# Patient Record
Sex: FEMALE | Race: WHITE | NOT HISPANIC OR LATINO | Employment: FULL TIME | URBAN - METROPOLITAN AREA
[De-identification: names, ages, dates, MRNs, and addresses within clinical notes are randomized per-mention and may not be internally consistent; named-entity substitution may affect disease eponyms.]

---

## 2019-09-20 ENCOUNTER — OFFICE VISIT (OUTPATIENT)
Dept: OBGYN CLINIC | Facility: HOSPITAL | Age: 69
End: 2019-09-20
Payer: COMMERCIAL

## 2019-09-20 ENCOUNTER — HOSPITAL ENCOUNTER (OUTPATIENT)
Dept: RADIOLOGY | Facility: HOSPITAL | Age: 69
Discharge: HOME | End: 2019-09-20
Attending: ORTHOPAEDIC SURGERY

## 2019-09-20 ENCOUNTER — HOSPITAL ENCOUNTER (EMERGENCY)
Facility: HOSPITAL | Age: 69
Discharge: HOME | End: 2019-09-20
Attending: EMERGENCY MEDICINE | Admitting: EMERGENCY MEDICINE
Payer: COMMERCIAL

## 2019-09-20 VITALS
DIASTOLIC BLOOD PRESSURE: 97 MMHG | SYSTOLIC BLOOD PRESSURE: 143 MMHG | RESPIRATION RATE: 18 BRPM | HEART RATE: 70 BPM | OXYGEN SATURATION: 97 % | TEMPERATURE: 97.4 F

## 2019-09-20 VITALS
WEIGHT: 225 LBS | HEIGHT: 63 IN | SYSTOLIC BLOOD PRESSURE: 127 MMHG | DIASTOLIC BLOOD PRESSURE: 79 MMHG | BODY MASS INDEX: 39.87 KG/M2 | HEART RATE: 76 BPM

## 2019-09-20 DIAGNOSIS — R20.0 NUMBNESS OF LEFT HAND: Primary | ICD-10-CM

## 2019-09-20 DIAGNOSIS — M25.522 PAIN IN LEFT ELBOW: ICD-10-CM

## 2019-09-20 DIAGNOSIS — G56.22 ULNAR NEURITIS, LEFT: Primary | ICD-10-CM

## 2019-09-20 PROCEDURE — 99283 EMERGENCY DEPT VISIT LOW MDM: CPT

## 2019-09-20 PROCEDURE — 99203 OFFICE O/P NEW LOW 30 MIN: CPT | Performed by: ORTHOPAEDIC SURGERY

## 2019-09-20 PROCEDURE — 99284 EMERGENCY DEPT VISIT MOD MDM: CPT | Performed by: EMERGENCY MEDICINE

## 2019-09-20 RX ORDER — PREDNISONE 20 MG/1
40 TABLET ORAL ONCE
Status: COMPLETED | OUTPATIENT
Start: 2019-09-20 | End: 2019-09-20

## 2019-09-20 RX ORDER — GABAPENTIN 100 MG/1
100 CAPSULE ORAL ONCE
Status: COMPLETED | OUTPATIENT
Start: 2019-09-20 | End: 2019-09-20

## 2019-09-20 RX ORDER — PREDNISONE 20 MG/1
40 TABLET ORAL DAILY
Qty: 4 TABLET | Refills: 0 | Status: SHIPPED | OUTPATIENT
Start: 2019-09-20 | End: 2019-09-22

## 2019-09-20 RX ORDER — OXYCODONE HYDROCHLORIDE 5 MG/1
5 TABLET ORAL EVERY 4 HOURS PRN
Qty: 2 TABLET | Refills: 0 | Status: SHIPPED | OUTPATIENT
Start: 2019-09-20 | End: 2019-10-04

## 2019-09-20 RX ORDER — OXYCODONE HYDROCHLORIDE AND ACETAMINOPHEN 5; 325 MG/1; MG/1
1 TABLET ORAL ONCE
Status: COMPLETED | OUTPATIENT
Start: 2019-09-20 | End: 2019-09-20

## 2019-09-20 RX ORDER — CELECOXIB 200 MG/1
200 CAPSULE ORAL DAILY
Qty: 30 CAPSULE | Refills: 1 | Status: SHIPPED | OUTPATIENT
Start: 2019-09-20 | End: 2021-03-25

## 2019-09-20 RX ORDER — TRAMADOL HYDROCHLORIDE 50 MG/1
50 TABLET ORAL EVERY 6 HOURS PRN
COMMUNITY
End: 2019-10-04

## 2019-09-20 RX ORDER — GABAPENTIN 100 MG/1
100 CAPSULE ORAL DAILY
Qty: 14 CAPSULE | Refills: 1 | Status: SHIPPED | OUTPATIENT
Start: 2019-09-20 | End: 2019-10-04

## 2019-09-20 RX ORDER — OXYCODONE HYDROCHLORIDE AND ACETAMINOPHEN 5; 325 MG/1; MG/1
2 TABLET ORAL ONCE
Status: DISCONTINUED | OUTPATIENT
Start: 2019-09-20 | End: 2019-09-20

## 2019-09-20 RX ADMIN — GABAPENTIN 100 MG: 100 CAPSULE ORAL at 02:20

## 2019-09-20 RX ADMIN — PREDNISONE 40 MG: 20 TABLET ORAL at 01:38

## 2019-09-20 RX ADMIN — OXYCODONE HYDROCHLORIDE AND ACETAMINOPHEN 1 TABLET: 5; 325 TABLET ORAL at 02:01

## 2019-09-20 NOTE — ED ATTENDING ATTESTATION
9/20/2019  IRenuka MD, saw and evaluated the patient  I have discussed the patient with the resident/non-physician practitioner and agree with the resident's/non-physician practitioner's findings, Plan of Care, and MDM as documented in the resident's/non-physician practitioner's note, except where noted  All available labs and Radiology studies were reviewed  I was present for key portions of any procedure(s) performed by the resident/non-physician practitioner and I was immediately available to provide assistance  At this point I agree with the current assessment done in the Emergency Department  I have conducted an independent evaluation of this patient a history and physical is as follows:    ED Course         Critical Care Time  Procedures   82 yo female with hx of ulnar neuritis with parasthesias and pain in left hand with pain into arm  No weakness  Pt seen by pcp and put on steroids with some relief initially  Pt given ultram today with no relief  No fever  Vss, afebrile, lungs cta, rrr, abdomen soft nontender  Left hand with normal ulnar nerve function, no motor deficits, tenderness noted    Pain meds, hand referral

## 2019-09-20 NOTE — LETTER
September 20, 2019     Suresh [address]    Patient: Hilda Izaguirre   YOB: 1950   Date of Visit: 9/20/2019       Dear Dr Hasmukh Infante:    Thank you for referring Luna Grant to me for evaluation  Below are my notes for this consultation  If you have questions, please do not hesitate to call me  I look forward to following your patient along with you  Sincerely,        Arie Henderson MD        CC: No Recipients  Arie Henderson MD  9/20/2019  4:17 PM  Sign at close encounter  Chief Complaint   Patient presents with    Left Wrist - Pain           Assessment:  Left wrist pain- rule out neuritis    Plan : This patient certainly has symptoms that are consistent with nerve irritation, yet I cannot pin this down to either median or ulnar nerve because she has numbness in her 3rd 4th and 5th fingers  She is not tender at her elbow  Sensation was normal in all 5 fingers  Her lack of response to Medrol Dosepak is somewhat bothersome because this usually can quiet the symptoms acutely  I do not want her to take anymore steroids and I put her on Celebrex 200 mg 1 tablet once daily after breakfast   This nonsteroidal medicine decreases both inflammation and pain  She was given strict instructions to stop this medicine if she develops any heartburn, nausea, vomiting, or diarrhea  I sent her for EMGs and  nerve conduction studies to see if she truly has nerve impingement either at the elbow or wrist, although this is on the early side of finding positive studies after any type of injury  She should continue with the wrist splint on full time day and night and may take it off for bathing and then reapply the splint  I will see her back again in 10 days to review the EMGs and re-evaluate her progress at that time  also will try gabapentin 100 mg once a day to decrease the nerve irritation    I told her to stop this medicine if it makes her overly drowsy      HPI:   This is a 68-year-old white female presenting today for orthopedic evaluation regarding her left wrist and hand pain  She is self referred  She states that about 2 weeks ago she has been having increasing pain in her left wrist that radiates to her ring and small finger  This is associated numbness and tingling  She states that her hand suddenly became swollen and she struggled to get her rings off  She cannot recall any obvious injury or trauma but mentions she was recently gardening around the time of onset  She denies any known presence of insect bite or wounds  She does mention that she has had Lyme disease at least twice but this was treated  She was seen at a [facility] where she was diagnosed with ulnar neuritis and was recommended she had a wrist splint and she was placed on a Medrol Dosepak  She states that her pain was improved with the first 2 days of dosing but shortly returned  Her pain increased so much so that last night she reported to the ED complaining of 10/10 pain  She was given a dose of prednisone and gabapentin which considerably relieved her symptoms  PMHx:         Past Medical History:   Diagnosis Date    Ulnar neuritis, left        Past Surgical History:   Procedure Laterality Date    TONSILLECTOMY         No family history on file  Social History     Socioeconomic History    Marital status: /Civil Union     Spouse name: Not on file    Number of children: Not on file    Years of education: Not on file    Highest education level: Not on file   Occupational History    Not on file   Social Needs    Financial resource strain: Not on file    Food insecurity:     Worry: Not on file     Inability: Not on file    Transportation needs:     Medical: Not on file     Non-medical: Not on file   Tobacco Use    Smoking status: Never Smoker    Smokeless tobacco: Never Used   Substance and Sexual Activity    Alcohol use:  Yes  Drug use: Not Currently    Sexual activity: Not on file   Lifestyle    Physical activity:     Days per week: Not on file     Minutes per session: Not on file    Stress: Not on file   Relationships    Social connections:     Talks on phone: Not on file     Gets together: Not on file     Attends Sikh service: Not on file     Active member of club or organization: Not on file     Attends meetings of clubs or organizations: Not on file     Relationship status: Not on file    Intimate partner violence:     Fear of current or ex partner: Not on file     Emotionally abused: Not on file     Physically abused: Not on file     Forced sexual activity: Not on file   Other Topics Concern    Not on file   Social History Narrative    Not on file       Current Outpatient Medications   Medication Sig Dispense Refill    oxyCODONE (ROXICODONE) 5 mg immediate release tablet Take 1 tablet (5 mg total) by mouth every 4 (four) hours as needed for moderate pain for up to 2 dosesMax Daily Amount: 30 mg (Patient not taking: Reported on 9/20/2019) 2 tablet 0    predniSONE 20 mg tablet Take 2 tablets (40 mg total) by mouth daily for 2 days (Patient not taking: Reported on 9/20/2019) 4 tablet 0    traMADol (ULTRAM) 50 mg tablet Take 50 mg by mouth every 6 (six) hours as needed for moderate pain       No current facility-administered medications for this visit  Allergies: Latex    ROS:  Positive for orthopedic complaints noted above  The remaining 11/12 systems on the intake sheet that I reviewed were negative  PE:  /79   Pulse 76   Ht 5' 3" (1 6 m)   Wt 102 kg (225 lb)   BMI 39 86 kg/m²    Constitutional: The patient was  oriented to person, place, and time  Mildly heavy  In no acute distress  HEENT: Vision intact  Hearing normal  Swallowing normal   Head: Normocephalic  Cardiovascular: Intact distal pulses  Pulse regular  Pulmonary/Chest: Effort normal  No respiratory distress     Neurological: Alert and oriented to person, place, and time  Skin: Skin is warm  Psychiatric: Normal mood and affect  Ortho Exam:  On today's exam of the left wrist, she was wearing a wrist splint  There was warmth and swelling of the left hand compared to the opposite right side  She was nontender to palpation  She is able make a full composite fist   Muro pinch  is 5/5  She had bilaterally equal wrist range of motion  She can fully extend the elbow and flex to 130 degrees  The elbow is stable to varus and valgus stress  Today's exam showed a negative Phalen's, negative Tinel's at both the wrist and elbow  There is no ulnar nerve subluxation with flexion and extension  Sensation was intact to pinprick in all 5 fingers with increased sensation in the ring and little fingers of that left hand  She showed good capillary refill of all fingers  There is palpable distal radial pulse  There is no antecubital adenopathy or cellulitis noted      Studies reviewed:  None today    Scribe Attestation    I,:   Geraldine Joyner MA am acting as a scribe while in the presence of the attending physician :        I,:   Lionel Iniguez MD personally performed the services described in this documentation    as scribed in my presence :

## 2019-09-20 NOTE — PROGRESS NOTES
Chief Complaint   Patient presents with    Left Wrist - Pain           Assessment:  Left wrist pain- rule out neuritis    Plan : This patient certainly has symptoms that are consistent with nerve irritation, yet I cannot pin this down to either median or ulnar nerve because she has numbness in her 3rd 4th and 5th fingers  She is not tender at her elbow  Sensation was normal in all 5 fingers  Her lack of response to Medrol Dosepak is somewhat bothersome because this usually can quiet the symptoms acutely  I do not want her to take anymore steroids and I put her on Celebrex 200 mg 1 tablet once daily after breakfast   This nonsteroidal medicine decreases both inflammation and pain  She was given strict instructions to stop this medicine if she develops any heartburn, nausea, vomiting, or diarrhea  I sent her for EMGs and  nerve conduction studies to see if she truly has nerve impingement either at the elbow or wrist, although this is on the early side of finding positive studies after any type of injury  She should continue with the wrist splint on full time day and night and may take it off for bathing and then reapply the splint  I will see her back again in 10 days to review the EMGs and re-evaluate her progress at that time  also will try gabapentin 100 mg once a day to decrease the nerve irritation  I told her to stop this medicine if it makes her overly drowsy      HPI:   This is a 29-year-old white female presenting today for orthopedic evaluation regarding her left wrist and hand pain  She is self referred  She states that about 2 weeks ago she has been having increasing pain in her left wrist that radiates to her ring and small finger  This is associated numbness and tingling  She states that her hand suddenly became swollen and she struggled to get her rings off  She cannot recall any obvious injury or trauma but mentions she was recently gardening around the time of onset    She denies any known presence of insect bite or wounds  She does mention that she has had Lyme disease at least twice but this was treated  She was seen at a [facility] where she was diagnosed with ulnar neuritis and was recommended she had a wrist splint and she was placed on a Medrol Dosepak  She states that her pain was improved with the first 2 days of dosing but shortly returned  Her pain increased so much so that last night she reported to the ED complaining of 10/10 pain  She was given a dose of prednisone and gabapentin which considerably relieved her symptoms  PMHx:         Past Medical History:   Diagnosis Date    Ulnar neuritis, left        Past Surgical History:   Procedure Laterality Date    TONSILLECTOMY         No family history on file  Social History     Socioeconomic History    Marital status: /Civil Union     Spouse name: Not on file    Number of children: Not on file    Years of education: Not on file    Highest education level: Not on file   Occupational History    Not on file   Social Needs    Financial resource strain: Not on file    Food insecurity:     Worry: Not on file     Inability: Not on file    Transportation needs:     Medical: Not on file     Non-medical: Not on file   Tobacco Use    Smoking status: Never Smoker    Smokeless tobacco: Never Used   Substance and Sexual Activity    Alcohol use:  Yes    Drug use: Not Currently    Sexual activity: Not on file   Lifestyle    Physical activity:     Days per week: Not on file     Minutes per session: Not on file    Stress: Not on file   Relationships    Social connections:     Talks on phone: Not on file     Gets together: Not on file     Attends Quaker service: Not on file     Active member of club or organization: Not on file     Attends meetings of clubs or organizations: Not on file     Relationship status: Not on file    Intimate partner violence:     Fear of current or ex partner: Not on file     Emotionally abused: Not on file     Physically abused: Not on file     Forced sexual activity: Not on file   Other Topics Concern    Not on file   Social History Narrative    Not on file       Current Outpatient Medications   Medication Sig Dispense Refill    oxyCODONE (ROXICODONE) 5 mg immediate release tablet Take 1 tablet (5 mg total) by mouth every 4 (four) hours as needed for moderate pain for up to 2 dosesMax Daily Amount: 30 mg (Patient not taking: Reported on 9/20/2019) 2 tablet 0    predniSONE 20 mg tablet Take 2 tablets (40 mg total) by mouth daily for 2 days (Patient not taking: Reported on 9/20/2019) 4 tablet 0    traMADol (ULTRAM) 50 mg tablet Take 50 mg by mouth every 6 (six) hours as needed for moderate pain       No current facility-administered medications for this visit  Allergies: Latex    ROS:  Positive for orthopedic complaints noted above  The remaining 11/12 systems on the intake sheet that I reviewed were negative  PE:  /79   Pulse 76   Ht 5' 3" (1 6 m)   Wt 102 kg (225 lb)   BMI 39 86 kg/m²   Constitutional: The patient was  oriented to person, place, and time  Mildly heavy  In no acute distress  HEENT: Vision intact  Hearing normal  Swallowing normal   Head: Normocephalic  Cardiovascular: Intact distal pulses  Pulse regular  Pulmonary/Chest: Effort normal  No respiratory distress  Neurological: Alert and oriented to person, place, and time  Skin: Skin is warm  Psychiatric: Normal mood and affect  Ortho Exam:  On today's exam of the left wrist, she was wearing a wrist splint  There was warmth and swelling of the left hand compared to the opposite right side  She was nontender to palpation  She is able make a full composite fist   Muro pinch  is 5/5  She had bilaterally equal wrist range of motion  She can fully extend the elbow and flex to 130 degrees  The elbow is stable to varus and valgus stress    Today's exam showed a negative Phalen's, negative Tinel's at both the wrist and elbow  There is no ulnar nerve subluxation with flexion and extension  Sensation was intact to pinprick in all 5 fingers with increased sensation in the ring and little fingers of that left hand  She showed good capillary refill of all fingers  There is palpable distal radial pulse  There is no antecubital adenopathy or cellulitis noted      Studies reviewed:  None today    Scribe Attestation    I,:   Rahat Melgar MA am acting as a scribe while in the presence of the attending physician :        I,:   Mariano Limon MD personally performed the services described in this documentation    as scribed in my presence :

## 2019-09-20 NOTE — PATIENT INSTRUCTIONS
Plan :  This patient certainly has symptoms that are consistent with nerve irritation, yet I cannot pin this down to either median or ulnar nerve because she has numbness in her 3rd 4th and 5th fingers  She is not tender at her elbow  Sensation was normal in all 5 fingers  Her lack of response to Medrol Dosepak is somewhat bothersome because this usually can quiet the symptoms acutely  I do not want her to take anymore steroids and I put her on Celebrex 200 mg 1 tablet once daily after breakfast   This nonsteroidal medicine decreases both inflammation and pain  She was given strict instructions to stop this medicine if she develops any heartburn, nausea, vomiting, or diarrhea  I sent her for EMGs and  nerve conduction studies to see if she truly has nerve impingement either at the elbow or wrist, although this is on the early side of finding positive studies after any type of injury  She should continue with the wrist splint on full time day and night and may take it off for bathing and then reapply the splint  I will see her back again in 10 days to review the EMGs and re-evaluate her progress at that time  I also will try gabapentin 100 mg once a day to decrease the nerve irritation    I told her to stop this medicine if it makes her overly drowsy

## 2019-09-20 NOTE — ED PROVIDER NOTES
History  Chief Complaint   Patient presents with    Wrist Pain     About a week ago pt was dianosed with L ulnarnoritis  Has been in unable to control pain at home with given medications here for pain management,  Reports a 10/10 pain in her L wrist as well as numbness and tingling     73-year-old female presents with 2 weeks of upper extremity pain and paresthesia  Says about a week ago she saw her PCP, was prescribed steroid with taper had some relief with the 1st couple of days but pain and tingling left upper extremity returned  Says that she mostly has tingling in her left 4th and 5th fingers and on the medial aspect of her left hand on the palmar side  Says the pain radiates up her left arm towards the shoulder  Was taking Motrin but discontinued as she was concerned about dark stool  Wonders if resting her left arm on the car door when she drives is contributory  Denies any weakness in the left upper extremity or hand  Has appointment with orthopedic hand surgeon tomorrow  Here for pain control, worried she will not be able to sleep tonight  Saw PCP again, was prescribed Ultram which had no affect with multiple doses today  Denies any trauma, any neck pain, any systemic symptoms  Denies chest pain, abdominal pain, back pain, shortness of breath, fever, chills, nausea, vomiting, diarrhea, dysuria, recent illness  Other PMH noncontributory  Prior to Admission Medications   Prescriptions Last Dose Informant Patient Reported? Taking?   traMADol (ULTRAM) 50 mg tablet   Yes Yes   Sig: Take 50 mg by mouth every 6 (six) hours as needed for moderate pain      Facility-Administered Medications: None       Past Medical History:   Diagnosis Date    Ulnar neuritis, left        Past Surgical History:   Procedure Laterality Date    TONSILLECTOMY         History reviewed  No pertinent family history  I have reviewed and agree with the history as documented      Social History     Tobacco Use    Smoking status: Never Smoker    Smokeless tobacco: Never Used   Substance Use Topics    Alcohol use: Yes    Drug use: Not Currently        Review of Systems   Constitutional: Negative for chills and fever  HENT: Negative for ear pain, sinus pain and sore throat  Eyes: Negative for pain  Respiratory: Negative for shortness of breath  Cardiovascular: Negative for chest pain  Gastrointestinal: Negative for abdominal pain, diarrhea, nausea and vomiting  Genitourinary: Negative for difficulty urinating and flank pain  Musculoskeletal: Negative for back pain and neck pain  Neurological: Positive for numbness  Negative for weakness and headaches  Tingling and pain   All other systems reviewed and are negative  Physical Exam  ED Triage Vitals [09/20/19 0040]   Temperature Pulse Respirations Blood Pressure SpO2   (!) 97 4 °F (36 3 °C) 70 18 143/97 97 %      Temp Source Heart Rate Source Patient Position - Orthostatic VS BP Location FiO2 (%)   Oral Monitor Sitting Right arm --      Pain Score       Worst Possible Pain             Orthostatic Vital Signs  Vitals:    09/20/19 0040   BP: 143/97   Pulse: 70   Patient Position - Orthostatic VS: Sitting       Physical Exam   Constitutional: She appears well-developed and well-nourished  No distress  HENT:   Head: Normocephalic  Nose: Nose normal    Mouth/Throat: Oropharynx is clear and moist    Eyes: Conjunctivae and EOM are normal  Right eye exhibits no discharge  Left eye exhibits no discharge  Neck: Normal range of motion  Neck supple  Cardiovascular: Normal rate and regular rhythm  Pulmonary/Chest: Effort normal  No stridor  No respiratory distress  Abdominal: She exhibits no distension  Musculoskeletal: Normal range of motion  She exhibits no edema, tenderness or deformity  Hyperesthesia left 4th and 5th fingers, medial and palmar side left hand  Normal strength, normal range of motion left hand and left upper extremity    Normal pulse, well perfused  Neurological: She is alert  No cranial nerve deficit  Skin: Skin is warm and dry  She is not diaphoretic  Psychiatric: She has a normal mood and affect  Her behavior is normal    Nursing note and vitals reviewed  ED Medications  Medications   predniSONE tablet 40 mg (40 mg Oral Given 9/20/19 0138)   oxyCODONE-acetaminophen (PERCOCET) 5-325 mg per tablet 1 tablet (1 tablet Oral Given 9/20/19 0201)   gabapentin (NEURONTIN) capsule 100 mg (100 mg Oral Given 9/20/19 0220)       Diagnostic Studies  Results Reviewed     None                 No orders to display         Procedures  Procedures        ED Course                               MDM  Number of Diagnoses or Management Options  Ulnar neuritis, left:   Diagnosis management comments: Prednisone, Percocet, gabapentin  X-rays likely to be low yield  Patient already has follow-up with Orthopedics tomorrow  Disposition  Final diagnoses:   Ulnar neuritis, left     Time reflects when diagnosis was documented in both MDM as applicable and the Disposition within this note     Time User Action Codes Description Comment    9/20/2019  1:57 AM Bean Mcnamara [G56 22] Ulnar neuritis, left       ED Disposition     ED Disposition Condition Date/Time Comment    Discharge Stable Fri Sep 20, 2019  1:56 AM Lilia Izaguirre discharge to home/self care  Follow-up Information     Follow up With Specialties Details Why Contact Info    Meghna Cabrera MD Family Medicine Schedule an appointment as soon as possible for a visit  For follow up regarding your symptoms [address] [facility]  [phone]      Hand surgeon  Go to  Please make your appointment tomorrow             Discharge Medication List as of 9/20/2019  2:38 AM      START taking these medications    Details   oxyCODONE (ROXICODONE) 5 mg immediate release tablet Take 1 tablet (5 mg total) by mouth every 4 (four) hours as needed for moderate pain for up to 2 dosesMax Daily Amount: 30 mg, Starting Fri 9/20/2019, Print      predniSONE 20 mg tablet Take 2 tablets (40 mg total) by mouth daily for 2 days, Starting Fri 9/20/2019, Until Sun 9/22/2019, Normal         CONTINUE these medications which have NOT CHANGED    Details   traMADol (ULTRAM) 50 mg tablet Take 50 mg by mouth every 6 (six) hours as needed for moderate pain, Historical Med           No discharge procedures on file  ED Provider  Attending physically available and evaluated Elena DOVER managed the patient along with the ED Attending      Electronically Signed by         Jose Britt MD  09/20/19 7328

## 2019-09-21 DIAGNOSIS — M79.2 NEURITIS OF UPPER EXTREMITY: Primary | ICD-10-CM

## 2019-09-21 RX ORDER — METHYLPREDNISOLONE 4 MG/1
TABLET ORAL
Qty: 21 TABLET | Refills: 0 | Status: SHIPPED | OUTPATIENT
Start: 2019-09-21 | End: 2019-10-04

## 2019-09-25 ENCOUNTER — PROCEDURE VISIT (OUTPATIENT)
Dept: NEUROLOGY | Facility: CLINIC | Age: 69
End: 2019-09-25
Payer: COMMERCIAL

## 2019-09-25 ENCOUNTER — TELEPHONE (OUTPATIENT)
Dept: OBGYN CLINIC | Facility: MEDICAL CENTER | Age: 69
End: 2019-09-25

## 2019-09-25 DIAGNOSIS — R20.0 NUMBNESS OF LEFT HAND: ICD-10-CM

## 2019-09-25 PROCEDURE — 95886 MUSC TEST DONE W/N TEST COMP: CPT | Performed by: PHYSICAL MEDICINE & REHABILITATION

## 2019-09-25 PROCEDURE — 95909 NRV CNDJ TST 5-6 STUDIES: CPT | Performed by: PHYSICAL MEDICINE & REHABILITATION

## 2019-09-25 NOTE — PROGRESS NOTES
EMG 2 Limb Upper Extremity     Date/Time 9/25/2019 3:20 PM     Performed by  Nasrin Johnson MD     Authorized by Katelyn Rivera MD      [place] Protocol Consent: Verbal consent obtained  Risks and benefits: risks, benefits and alternatives were discussed  Consent given by: patient  Patient understanding: patient states understanding of the procedure being performed  Patient consent: the patient's understanding of the procedure matches consent given  Patient identity confirmed: verbally with patient             EMG REPORT    59-year-old white female presents with left wrist and hand pain  She states that about 2 weeks ago she has been having increasing pain in the left wrist that radiates to the ring and small finger associated with tingling and numbness  Patient is being referred for evaluation of a focal neuropathy  The left and right median and right ulnar motor conduction velocities and compound muscle action potentials were normal with normal distal latencies across the wrists  The left ulnar motor terminal latency was normal with a low compound motor action potential amplitude of 4 3 mV and normal conduction velocity distally and across the elbow  The left and right median,radial and ulnar sensory conduction velocity and sensory action potentials were also normal with normal distal latencies across the wrists  The left and right median and ulnar F wave latencies were within normal limits  Concentric needle EMG of the upper extremities  was performed on various proximal and distal muscles of the bilateral upper extremities including deltoid, biceps, triceps, FCU,FDP4,5 apb, FDI and low cervical paraspinals  There was no evidence of active denervation any other muscles tested  Moderate decreased recruitment of giant motor units was noted in the FCU, and FDI  Mild decreased recruitment of giant motor units was noted in the FDP 4, 5   Early recruited motor units appear normal with recruitment patterns being full or full for effort in the remaining muscles tested  INTERPRETATION: There is electrophysiologic evidence of a:    1  Ulnar neuropathy at the elbow  on the left with  predominant axonal changes , affecting mainly the motor branch  2 Mild median nerve compression neuropathy at the wrist on the right with demyelinative changes, consistent with a diagnosis of carpal tunnel syndrome  3   There is no evidence of a cervical radiculopathy bilaterally  Clinical correlation is recommended       ADAL Lainez

## 2019-09-26 NOTE — TELEPHONE ENCOUNTER
I called the patient with the results of her EMGs  The report states that there is ulnar nerve entrapment, mainly motor, at the elbow and mild carpal tunnel syndrome  She had no tenderness over the elbow at the time of her exam and this further obfuscates the diagnosis  I will send her to see my partner Dr Loki Bowen, our chief of hand surgery, for 2nd opinion to see if he can decipher how to help this nice patient    Her pain which was terribly severe before has quieted somewhat with Motrin over the last day or so

## 2019-09-30 NOTE — TELEPHONE ENCOUNTER
Patient is calling back for an appointment with Dr Ofelia Paiz per Dr Vinay Cole   Email sent to practice admin

## 2019-10-04 ENCOUNTER — OFFICE VISIT (OUTPATIENT)
Dept: OBGYN CLINIC | Facility: HOSPITAL | Age: 69
End: 2019-10-04
Payer: COMMERCIAL

## 2019-10-04 VITALS
BODY MASS INDEX: 40.64 KG/M2 | WEIGHT: 229.4 LBS | SYSTOLIC BLOOD PRESSURE: 134 MMHG | HEART RATE: 80 BPM | DIASTOLIC BLOOD PRESSURE: 81 MMHG | HEIGHT: 63 IN

## 2019-10-04 DIAGNOSIS — M79.642 HAND PAIN, LEFT: ICD-10-CM

## 2019-10-04 DIAGNOSIS — M25.522 PAIN IN LEFT ELBOW: Primary | ICD-10-CM

## 2019-10-04 DIAGNOSIS — R20.0 NUMBNESS OF LEFT HAND: ICD-10-CM

## 2019-10-04 PROCEDURE — 99213 OFFICE O/P EST LOW 20 MIN: CPT | Performed by: ORTHOPAEDIC SURGERY

## 2019-10-04 RX ORDER — MELOXICAM 7.5 MG/1
7.5 TABLET ORAL DAILY
Qty: 30 TABLET | Refills: 1 | Status: SHIPPED | OUTPATIENT
Start: 2019-10-04 | End: 2021-03-25

## 2019-10-04 NOTE — PROGRESS NOTES
ASSESSMENT/PLAN:    Assessment:   Left hand numbness    Plan:   MRI left wrist eval for ganglion cyst, ulnar nerve entrapment at [facility], ulnar artery aneurism   Patient given a script for Mobic    Follow Up: After Testing    To Do Next Visit:   discuss MRI      _____________________________________________________  CHIEF COMPLAINT:  Chief Complaint   Patient presents with    Left Hand - Pain, Numbness         SUBJECTIVE:  Ld Norman is a 81y o  year old female who presents with Pain  Moderate  Intermittant  Dull, Electric and Aching and Numbness to the left long finger, ring finger and small finger  This started late August - early September 2019  She states it started with pain and swelling in the left ring finger  She states she had to force her rings off  Patient was initially seen at [place] [address]  were she was told she has ulnar neurits and given a wrist splint and medrol dose pack  She was later seen by Dr Criss Harrison on 9/20/19 who prescribe Celebrex and ordered an EMG and referred the patient here today for orthopedic consultation  Patient presents today to discuss the findings of the EMG along with treatment options  PAST MEDICAL HISTORY:  Past Medical History:   Diagnosis Date    Ulnar neuritis, left        PAST SURGICAL HISTORY:  Past Surgical History:   Procedure Laterality Date    TONSILLECTOMY         FAMILY HISTORY:  History reviewed  No pertinent family history  SOCIAL HISTORY:  Social History     Tobacco Use    Smoking status: Never Smoker    Smokeless tobacco: Never Used   Substance Use Topics    Alcohol use: Yes    Drug use: Not Currently       MEDICATIONS:    Current Outpatient Medications:     celecoxib (CeleBREX) 200 mg capsule, Take 1 capsule (200 mg total) by mouth daily, Disp: 30 capsule, Rfl: 1    ALLERGIES:  Allergies   Allergen Reactions    Latex        REVIEW OF SYSTEMS:  Pertinent items are noted in HPI    A comprehensive review of systems was negative  LABS:  HgA1c: No results found for: HGBA1C  BMP: No results found for: GLUCOSE, CALCIUM, NA, K, CO2, CL, BUN, CREATININE      _____________________________________________________  PHYSICAL EXAMINATION:  /81   Pulse 80   Ht 5' 3" (1 6 m)   Wt 104 kg (229 lb 6 4 oz)   BMI 40 64 kg/m²   General: well developed and well nourished, alert, oriented times 3 and appears comfortable  Psychiatric: Normal  HEENT: Trachea Midline, No torticollis  Cardiovascular: No discernable arrhythmia  Pulmonary: No wheezing or stridor  Skin: No masses, erythema, lacerations, fluctation, ulcerations  Neurovascular: Pulses Intact    MUSCULOSKELETAL EXAMINATION:    Left Carpal Tunnel Exam:  Negative thenar atrophy  Positive phalen's test  Positive carpal tunnel compression  Positive tinels carpal tunnel, Positive tinel's over guyon's cannel greater than carpal tunnel, positive compression at guyon's cannel,  Opposition strength 5/5  Abduction strength 5/5  FDP small 5/5, intrinsic 4/5, positive froment sign, no wartenberg, no clawing  Negative tinel's ulnar nerve at the elbow    left ring finger:  Positive palpable nodule over the A1 pulley  Positive tenderness to palpation over A1 pulley  Negative catching  Negative clicking  Mild crepitation      _____________________________________________________  STUDIES REVIEWED:  EMG: LUE shows ulnar entrapment at the left elbow and mild carpal tunnel syndrome        PROCEDURES PERFORMED:  Procedures  No Procedures performed today    Scribe Attestation    I,:   Nieves Esparza am acting as a scribe while in the presence of the attending physician :        I,:   Elizabeth Montano MD personally performed the services described in this documentation    as scribed in my presence :

## 2019-10-22 ENCOUNTER — HOSPITAL ENCOUNTER (OUTPATIENT)
Dept: MRI IMAGING | Facility: HOSPITAL | Age: 69
Discharge: HOME | End: 2019-10-22
Attending: ORTHOPAEDIC SURGERY
Payer: COMMERCIAL

## 2019-10-22 DIAGNOSIS — M79.642 HAND PAIN, LEFT: ICD-10-CM

## 2019-10-22 DIAGNOSIS — R20.0 NUMBNESS OF LEFT HAND: ICD-10-CM

## 2019-10-22 PROCEDURE — 73223 MRI JOINT UPR EXTR W/O&W/DYE: CPT

## 2019-10-22 PROCEDURE — A9585 GADOBUTROL INJECTION: HCPCS | Performed by: ORTHOPAEDIC SURGERY

## 2019-10-22 RX ADMIN — GADOBUTROL 10 ML: 604.72 INJECTION INTRAVENOUS at 15:48

## 2019-10-23 ENCOUNTER — TELEPHONE (OUTPATIENT)
Dept: OBGYN CLINIC | Facility: HOSPITAL | Age: 69
End: 2019-10-23

## 2019-10-23 NOTE — TELEPHONE ENCOUNTER
Please be advise I spoke to pt  Who stated the Tylenol and MOBIC is not helping  Pt  Is requesting a muscle relaxer   Please advise

## 2019-10-23 NOTE — TELEPHONE ENCOUNTER
Patient called in  # [phone]    Patient said that she had an MRI yesterday for her wrist and is now having pain in her back  Patient is requesting something for the pain  She said that she was evaluated for over 50 minutes and her hands was over her head and now she is having back pain  Please advise

## 2019-10-23 NOTE — TELEPHONE ENCOUNTER
The patient can take Tylenol 650mg 3 times per day with the mobic that was prescribed for her at her last visit  That is the only medication recommended at this time  Please make patient aware

## 2019-10-23 NOTE — TELEPHONE ENCOUNTER
I called patient back and left a detailed msg on her voicemail  I asked her to call our office back should there be any questions

## 2019-10-23 NOTE — TELEPHONE ENCOUNTER
D/W Dr Laney Nelson - we will not give a muscle relaxer for this and recommend taking the mobic and tylenol around the clock (not as needed) for 3-5 days  She can increase her Mobic 7 5mg to BID to take a total of 15mg a day temporarily as well, but should reduce this back to 7 5 once she is able  Also advise applying heat to the area and perform some gentle stretches  [facility] has a spine exercise program on their website if she needs help with these:   https://orthoinfo  aaos  org/globalassets/pdfs/2017-rehab_spine  pdf

## 2019-10-28 ENCOUNTER — OFFICE VISIT (OUTPATIENT)
Dept: OBGYN CLINIC | Facility: CLINIC | Age: 69
End: 2019-10-28
Payer: COMMERCIAL

## 2019-10-28 ENCOUNTER — PREP FOR PROCEDURE (OUTPATIENT)
Dept: OBGYN CLINIC | Facility: CLINIC | Age: 69
End: 2019-10-28

## 2019-10-28 VITALS
WEIGHT: 229 LBS | DIASTOLIC BLOOD PRESSURE: 89 MMHG | HEIGHT: 63 IN | HEART RATE: 84 BPM | BODY MASS INDEX: 40.57 KG/M2 | SYSTOLIC BLOOD PRESSURE: 142 MMHG

## 2019-10-28 DIAGNOSIS — G56.22 CUBITAL TUNNEL SYNDROME ON LEFT: Primary | ICD-10-CM

## 2019-10-28 DIAGNOSIS — Z01.812 PRE-OPERATIVE LABORATORY EXAMINATION: ICD-10-CM

## 2019-10-28 PROCEDURE — 99214 OFFICE O/P EST MOD 30 MIN: CPT | Performed by: ORTHOPAEDIC SURGERY

## 2019-10-28 RX ORDER — LIDOCAINE HYDROCHLORIDE AND EPINEPHRINE 10; 10 MG/ML; UG/ML
20 INJECTION, SOLUTION INFILTRATION; PERINEURAL ONCE
Status: CANCELLED | OUTPATIENT
Start: 2019-10-28 | End: 2019-10-28

## 2019-10-28 NOTE — PROGRESS NOTES
ASSESSMENT/PLAN:    Assessment:   Left elbow cubital tunnel syndrome    Plan:   Diagnostics reviewed and physical exam performed  Diagnosis, treatment options and associated risks were discussed with the patient including no treatment, nonsurgical treatment and potential for surgical intervention  The patient was given the opportunity to ask questions regarding each  Given her essentially normal wrist MRI her symptoms and exam findings are consistent with cubital tunnel syndrome at her left elbow  Risks and benefits of left cubital tunnel release were discussed  Consents obtained  Work noted provided  Follow Up: After Surgery    To Do Next Visit:    and Sutures out      Operative Discussions:     Cubital Tunnel Release: The anatomy and physiology of cubital tunnel syndrome were discussed with the patient today in the office  Typically, increased elbow flexion activities decrease blood flow within the intraneural spaces, resulting in a feeling of numbness, tingling, weakness, or clumsiness within the hand and fingers  Occasionally, anatomic structures such as medial elbow osteophytes, the medial head of the triceps, were subluxing ulnar nerve may result in increased pressure or aggravation at the cubital tunnel  Typical signs and symptoms usually include numbness and tingling within the ring and small finger, weakness with , and weakness with pinch  Conservative treatment and includes nocturnal bracing to keep the elbow in a semi-extended position, activity modification, therapy, and avoiding excessive elbow flexion activities  A majority of patients typically respond to conservative treatment over a period of approximately 3-6 months  EMG/NCV testing of the ulnar nerve at the elbow is not as reliable as carpal tunnel syndrome  Surgical intervention in the form of in situ release of the ulnar nerve at the elbow or ulnar nerve transposition may be required in up to 20% of patients   The patient has elected to undergo an cubital tunnel release  The possibility of converting to an open procedure, or a subcutaneous or submuscular ulnar nerve transposition depending on the nerve stability was discussed with the patient  Typically, in the postoperative period, light activities are allowed immediately, driving is allowed when narcotic medications have stopped, and the incision may get wet after 5 days  Heavy activities will be allowed after follow up appointment in 1-2 weeks  Anti-inflammatory medications should be held for approximately 5 days postoperative  While the pain within the ring and small finger of the hands generally improves rapidly, the numbness and tingling, as well as the strength, will slowly improve over a period of weeks to months  Total recovery can take up to 18 months from the time of surgery  Numbness and tingling near the incision, or near the medial aspect of the forearm was discussed with the patient  The patient has an understanding of the above mentioned discussion  The risks and benefits of the procedure were explained to the patient, which include, but are not limited to: Bleeding, infection, recurrence, pain, scar, damage to tendons, damage to nerves, and damage to blood vessels, failure to give desired results and complications related to anesthesia   These risks, along with alternative conservative treatment options, and postoperative protocols were voiced back and understood by the patient   All questions were answered to the patient's satisfaction   The patient agrees to comply with a standard postoperative protocol, and is willing to proceed   Education was provided via written and auditory forms   There were no barriers to learning   Written handouts regarding wound care, incision and scar care, and general preoperative information was provided to the patient   Prior to surgery, the patient may be requested to stop all anti-inflammatory medications   Prophylactic aspirin, Plavix, and Coumadin may be allowed to be continued   Medications including vitamin E , ginkgo, and fish oil are requested to be stopped approximately one week prior to surgery   Hypertensive medications and beta blockers, if taken, should be continued  Left elbow cubital tunnel release    _____________________________________________________  CHIEF COMPLAINT:  Chief Complaint   Patient presents with    Left Wrist - Follow-up         SUBJECTIVE:  Brenna Velez is a 81 y o  female who presents for discussion of her left wrist MRI  At her last visit her symptoms were suspicious for cubital tunnel and or carpal tunnel syndrome  She was sent for an MRI for further evaluation to make sure there is no compressive lesion at her wrist   She returns today with numbness and tingling small, ring, long finger  She has been finding relief with Tylenol and Aleve compared to previous Celebrex and Mobic  PAST MEDICAL HISTORY:  Past Medical History:   Diagnosis Date    Ulnar neuritis, left        PAST SURGICAL HISTORY:  Past Surgical History:   Procedure Laterality Date    TONSILLECTOMY         FAMILY HISTORY:  History reviewed  No pertinent family history  SOCIAL HISTORY:  Social History     Tobacco Use    Smoking status: Never Smoker    Smokeless tobacco: Never Used   Substance Use Topics    Alcohol use: Yes    Drug use: Not Currently       MEDICATIONS:    Current Outpatient Medications:     meloxicam (MOBIC) 7 5 mg tablet, Take 1 tablet (7 5 mg total) by mouth daily, Disp: 30 tablet, Rfl: 1    celecoxib (CeleBREX) 200 mg capsule, Take 1 capsule (200 mg total) by mouth daily (Patient not taking: Reported on 10/28/2019), Disp: 30 capsule, Rfl: 1    ALLERGIES:  Allergies   Allergen Reactions    Latex        REVIEW OF SYSTEMS:  Pertinent items are noted in HPI    A comprehensive review of systems was negative   + for joint pain, neck pain, muscle pain, back pain, waking up at night, cramping, numbness, tingling    LABS:  HgA1c: No results found for: HGBA1C  BMP: No results found for: GLUCOSE, CALCIUM, NA, K, CO2, CL, BUN, CREATININE        _____________________________________________________  PHYSICAL EXAMINATION:  Vital signs: /89   Pulse 84   Ht 5' 3" (1 6 m)   Wt 104 kg (229 lb)   BMI 40 57 kg/m²   General: well developed and well nourished, alert, oriented times 3 and appears comfortable  Psychiatric: Normal  HEENT: Trachea Midline, No torticollis  Cardiovascular: No discernable arrhythmia  Pulmonary: No wheezing or stridor  Skin: No masses, erythema, lacerations, fluctation, ulcerations  Neurovascular: Sensation Intact to the Median, Ulnar, Radial Nerve, Motor Intact to the Radial Nerve and Pulses Intact    MUSCULOSKELETAL EXAMINATION:  LEFT SIDE:  Elbow:  No bursitis and full ROM, good strength, + tinels cubital tunnel, slight clawing to the small finger and Wrist:  Full ROM, No Tenderness, No Instability and + tinels guyons cancal, FPL 5/5, intrinsics 4/5, APB 4+    _____________________________________________________  STUDIES REVIEWED:  Left wrist MRI was reviewed today and shows:  No compressive lesion or cyst at carpal tunnel or Guyon's canal      PROCEDURES PERFORMED:  Procedures  No Procedures performed today   Scribe Attestation    I,:   David Sims am acting as a scribe while in the presence of the attending physician :        I,:   Tavares Reagan MD personally performed the services described in this documentation    as scribed in my presence :            Scribe Attestation    I,:   David Sims am acting as a scribe while in the presence of the attending physician :        I,:   Tavares Reagan MD personally performed the services described in this documentation    as scribed in my presence :

## 2019-10-28 NOTE — H&P
ASSESSMENT/PLAN:    Assessment:   Left elbow cubital tunnel syndrome    Plan:   Diagnostics reviewed and physical exam performed  Diagnosis, treatment options and associated risks were discussed with the patient including no treatment, nonsurgical treatment and potential for surgical intervention  The patient was given the opportunity to ask questions regarding each  Given her essentially normal wrist MRI her symptoms and exam findings are consistent with cubital tunnel syndrome at her left elbow  Risks and benefits of left cubital tunnel release were discussed  Consents obtained  Work noted provided  Follow Up: After Surgery    To Do Next Visit:    and Sutures out      Operative Discussions:     Cubital Tunnel Release: The anatomy and physiology of cubital tunnel syndrome were discussed with the patient today in the office  Typically, increased elbow flexion activities decrease blood flow within the intraneural spaces, resulting in a feeling of numbness, tingling, weakness, or clumsiness within the hand and fingers  Occasionally, anatomic structures such as medial elbow osteophytes, the medial head of the triceps, were subluxing ulnar nerve may result in increased pressure or aggravation at the cubital tunnel  Typical signs and symptoms usually include numbness and tingling within the ring and small finger, weakness with , and weakness with pinch  Conservative treatment and includes nocturnal bracing to keep the elbow in a semi-extended position, activity modification, therapy, and avoiding excessive elbow flexion activities  A majority of patients typically respond to conservative treatment over a period of approximately 3-6 months  EMG/NCV testing of the ulnar nerve at the elbow is not as reliable as carpal tunnel syndrome  Surgical intervention in the form of in situ release of the ulnar nerve at the elbow or ulnar nerve transposition may be required in up to 20% of patients   The patient has elected to undergo an cubital tunnel release  The possibility of converting to an open procedure, or a subcutaneous or submuscular ulnar nerve transposition depending on the nerve stability was discussed with the patient  Typically, in the postoperative period, light activities are allowed immediately, driving is allowed when narcotic medications have stopped, and the incision may get wet after 5 days  Heavy activities will be allowed after follow up appointment in 1-2 weeks  Anti-inflammatory medications should be held for approximately 5 days postoperative  While the pain within the ring and small finger of the hands generally improves rapidly, the numbness and tingling, as well as the strength, will slowly improve over a period of weeks to months  Total recovery can take up to 18 months from the time of surgery  Numbness and tingling near the incision, or near the medial aspect of the forearm was discussed with the patient  The patient has an understanding of the above mentioned discussion  The risks and benefits of the procedure were explained to the patient, which include, but are not limited to: Bleeding, infection, recurrence, pain, scar, damage to tendons, damage to nerves, and damage to blood vessels, failure to give desired results and complications related to anesthesia   These risks, along with alternative conservative treatment options, and postoperative protocols were voiced back and understood by the patient   All questions were answered to the patient's satisfaction   The patient agrees to comply with a standard postoperative protocol, and is willing to proceed   Education was provided via written and auditory forms   There were no barriers to learning   Written handouts regarding wound care, incision and scar care, and general preoperative information was provided to the patient   Prior to surgery, the patient may be requested to stop all anti-inflammatory medications   Prophylactic aspirin, Plavix, and Coumadin may be allowed to be continued   Medications including vitamin E , ginkgo, and fish oil are requested to be stopped approximately one week prior to surgery   Hypertensive medications and beta blockers, if taken, should be continued  Left elbow cubital tunnel release    _____________________________________________________  CHIEF COMPLAINT:  Chief Complaint   Patient presents with    Left Wrist - Follow-up         SUBJECTIVE:  Jelly Toney is a 81 y o  female who presents for discussion of her left wrist MRI  At her last visit her symptoms were suspicious for cubital tunnel and or carpal tunnel syndrome  She was sent for an MRI for further evaluation to make sure there is no compressive lesion at her wrist   She returns today with numbness and tingling small, ring, long finger  She has been finding relief with Tylenol and Aleve compared to previous Celebrex and Mobic  PAST MEDICAL HISTORY:  Past Medical History:   Diagnosis Date    Ulnar neuritis, left        PAST SURGICAL HISTORY:  Past Surgical History:   Procedure Laterality Date    TONSILLECTOMY         FAMILY HISTORY:  History reviewed  No pertinent family history  SOCIAL HISTORY:  Social History     Tobacco Use    Smoking status: Never Smoker    Smokeless tobacco: Never Used   Substance Use Topics    Alcohol use: Yes    Drug use: Not Currently       MEDICATIONS:    Current Outpatient Medications:     meloxicam (MOBIC) 7 5 mg tablet, Take 1 tablet (7 5 mg total) by mouth daily, Disp: 30 tablet, Rfl: 1    celecoxib (CeleBREX) 200 mg capsule, Take 1 capsule (200 mg total) by mouth daily (Patient not taking: Reported on 10/28/2019), Disp: 30 capsule, Rfl: 1    ALLERGIES:  Allergies   Allergen Reactions    Latex        REVIEW OF SYSTEMS:  Pertinent items are noted in HPI    A comprehensive review of systems was negative   + for joint pain, neck pain, muscle pain, back pain, waking up at night, cramping, numbness, tingling    LABS:  HgA1c: No results found for: HGBA1C  BMP: No results found for: GLUCOSE, CALCIUM, NA, K, CO2, CL, BUN, CREATININE        _____________________________________________________  PHYSICAL EXAMINATION:  Vital signs: /89   Pulse 84   Ht 5' 3" (1 6 m)   Wt 104 kg (229 lb)   BMI 40 57 kg/m²    General: well developed and well nourished, alert, oriented times 3 and appears comfortable  Psychiatric: Normal  HEENT: Trachea Midline, No torticollis  Cardiovascular: No discernable arrhythmia  Pulmonary: No wheezing or stridor  Skin: No masses, erythema, lacerations, fluctation, ulcerations  Neurovascular: Sensation Intact to the Median, Ulnar, Radial Nerve, Motor Intact to the Radial Nerve and Pulses Intact    MUSCULOSKELETAL EXAMINATION:  LEFT SIDE:  Elbow:  No bursitis and full ROM, good strength, + tinels cubital tunnel, slight clawing to the small finger and Wrist:  Full ROM, No Tenderness, No Instability and + tinels guyons cancal, FPL 5/5, intrinsics 4/5, APB 4+    _____________________________________________________  STUDIES REVIEWED:  Left wrist MRI was reviewed today and shows:  No compressive lesion or cyst at carpal tunnel or Guyon's canal      PROCEDURES PERFORMED:  Procedures  No Procedures performed today

## 2019-10-28 NOTE — LETTER
October 28, 2019     Patient: Joana Salcido   YOB: 1950   Date of Visit: 10/28/2019       To Whom it May Concern:    Joana Salcido is under my professional care  She was seen in my office on 10/28/2019  She is to avoid repetitive pushing, pulling, or grasping, able to rest 5 mins of every one hour of work  If you have any questions or concerns, please don't hesitate to call           Sincerely,          Ki Marcelo MD        CC: No Recipients

## 2019-11-06 ENCOUNTER — OFFICE VISIT (OUTPATIENT)
Dept: OBGYN CLINIC | Facility: MEDICAL CENTER | Age: 69
End: 2019-11-06
Payer: COMMERCIAL

## 2019-11-06 VITALS
DIASTOLIC BLOOD PRESSURE: 76 MMHG | BODY MASS INDEX: 40.57 KG/M2 | HEART RATE: 108 BPM | SYSTOLIC BLOOD PRESSURE: 137 MMHG | WEIGHT: 229 LBS | HEIGHT: 63 IN

## 2019-11-06 DIAGNOSIS — R20.0 NUMBNESS OF LEFT HAND: Primary | ICD-10-CM

## 2019-11-06 PROCEDURE — 99214 OFFICE O/P EST MOD 30 MIN: CPT | Performed by: ORTHOPAEDIC SURGERY

## 2019-11-06 NOTE — PATIENT INSTRUCTIONS
Cubital Tunnel Syndrome    DESCRIPTION  Cubital Tunnel Syndrome is a condition that involves pressure or stretching of the ulnar nerve (also known as the funny bone nerve), which can cause numbness or tingling in the ring and small fingers, pain in the forearm, and/or weakness in the hand  The ulnar nerve (Figure 1) runs in a groove on the inner side of the elbow  CAUSES  There are a few causes of this ulnar nerve problem  These include:    Pressure: The nerve has little padding over it  Direct pressure (like leaning the arm on an arm rest) can press the nerve, causing the arm and hand -- especially the ring and small fingers -- to fall asleep      Stretching:  Keeping the elbow bent for a long time can stretch the nerve behind the elbow  This can happen during sleep  Anatomy:  Sometimes, the ulnar nerve does not stay in its place and snaps back and forth over a bony bump as the elbow is moved  Repeated snapping can irritate the nerve  Sometimes, the soft tissues over the nerve become thicker or there is an extra muscle over the nerve that can keep it from working correctly  SIGNS AND SYMPTOMS  Cubital tunnel syndrome can cause pain, loss of sensation, tingling and/or weakness  Pins and needles usually are felt in the ring and small fingers  These symptoms are often felt when the elbow is bent for a long period of time, such as while holding a phone or while sleeping  Some people feel weak or clumsy  DIAGNOSIS   Your doctor can learn much by asking you about your symptoms and examining you  S/he might test you for other medical problems like diabetes or thyroid disease  Sometimes, nerve testing (EMG/NCS) may be needed to see how much the nerve and muscle are being affected  This test also checks for other problems such as a pinched nerve in the neck, which can cause similar symptoms  TREATMENT  The first treatment is to avoid actions that cause symptoms    Wrapping a pillow or towel loosely around the elbow (see below) or wearing a splint at night to keep the elbow from bending can help  Avoiding leaning on the funny bone can also help  A hand therapist can help you find ways to avoid pressure on the nerve  Sometimes, surgery may be needed to relieve the pressure on the nerve  This can involve releasing the nerve, moving the nerve to the front of the elbow, and/or removing a part of the bone  Your surgeon will talk to you about options  Therapy is sometimes needed after surgery, and the time it takes to recover can vary  Numbness and tingling may improve quickly or slowly  It may take many months for recovery after surgery  Cubital tunnel symptoms may not totally go away after surgery, especially if symptoms are severe

## 2019-11-06 NOTE — PROGRESS NOTES
Chief Complaint     Left hand numbness and tingling   Left wrist pain       History of the Present Illness     Marlene Dick is a 81 y o  female who presents with left hand numbness and tingling as well as left wrist pain  Patient states in August she got poison ivy on her left hand  She states that her fingers got all swollen and she cannot get her rings off her ring finger  Patient states she did soak her hand in cold water and pulled her rings off which started her numbness and tingling and pain into her ring finger  She states that she noticed color changes to the skin of her ring finger over the next few weeks  She states a few weeks later her wrist pain began and she noticed swelling to her volar wrist   Patient describes physically being able to move her tendon around on her volar wrist   Patient states that her wrist pain and swelling persisted up until Friday and has significantly improved as well as her numbness and tingling though it still exists in a mild form  She has been using a volar wrist splint at nighttime  Patient states the last 3-4 days was the 1st full night of sleep that she has gotten since August   Patient states that she previously saw Dr Ilya Lloyd and was scheduled for a cubital tunnel release however she does not believe that her problem is coming from her elbow  She is here today for a 2nd opinion         Past Medical History:   Diagnosis Date    Ulnar neuritis, left        Past Surgical History:   Procedure Laterality Date    TONSILLECTOMY         Allergies   Allergen Reactions    Latex        Current Outpatient Medications on File Prior to Visit   Medication Sig Dispense Refill    celecoxib (CeleBREX) 200 mg capsule Take 1 capsule (200 mg total) by mouth daily (Patient not taking: Reported on 10/28/2019) 30 capsule 1    meloxicam (MOBIC) 7 5 mg tablet Take 1 tablet (7 5 mg total) by mouth daily (Patient not taking: Reported on 11/6/2019) 30 tablet 1     No current facility-administered medications on file prior to visit  Social History     Tobacco Use    Smoking status: Never Smoker    Smokeless tobacco: Never Used   Substance Use Topics    Alcohol use: Yes    Drug use: Not Currently       History reviewed  No pertinent family history  Review of Systems     As stated in the HPI  All other systems were reviewed and are negative  Physical Exam     /76   Pulse (!) 108   Ht 5' 3" (1 6 m)   Wt 104 kg (229 lb)   BMI 40 57 kg/m²     GENERAL: This is a well-developed, well-nourished, age-appropriate patient in no acute distress  The patient is alert and oriented x3  Pleasant and cooperative  Eyes: Anicteric sclerae  Extraocular movements appear intact  HENT: Nares are patent with no drainage  Lungs: There is equal chest rise on inspection  Breathing is non-labored with no audible wheezing  Cardiovascular: No cyanosis  No upper extremity lymphadema  Skin: Skin is warm to touch  No obvious skin lesions or rashes other than described below  Neurologic: No ataxia  Psychiatric: Mood and affect are appropriate  Musculoskeletal examination:  LEFT SIDE:  full neck ROM, full shoulder ROM, full elbow ROM, full pronation and supination, full wrist ROM, full composite fist, ulnar sided interosseous 4/5, fdi 5/5, fdp 4/5 index, fdp 4+/5 to small, apb 5/5, negative tinels at wrist, no ulnar nerve subluxation, negative tinels at cubital tunnel, 2+ radial pulse, negative durkan's compression test, positive pitres-testut confrontation test positive  Negative fromants   in station 2 on right 70,  in station 2 on the left 25, 2 point descrimination 5mm throughout,     Data Review     Results Reviewed     None         Imaging:  No imaging to review today  Assessment and Plan      Diagnoses and all orders for this visit:    Numbness of left hand       I had a long discussion with the patient about her physical exam findings as well as history    This story about her ring and the trauma that she sustained trying to get off appears to be noncontributory to any numbness and tingling that she has given that it was only in the ring finger that she had the ring on and not in the middle and small finger where she has some neuropathy signs  In addition, ulnar-sided intrinsics are decreased in their strength though I question the amount of effort she is giving on her exam and her 1st dorsal interosseous is 5 5  In addition, she has a negative Froment's sign but positive pitres-testut and confrontation test   She also has essentially full strength in her FDP to her small finger which would eliminate the possibility of a proximal ulnar nerve entrapment  I discussed with her that I am very confused by her presentation and that I do not have a specific diagnosis to give her for her condition  I also discussed with her that surgical intervention in my hands is usually reserved for people with very defined diagnoses that I feel can be improved going into the surgery rather than performing exploratory surgery  I discussed with her that though she does have some decreased  strength, she is already showing some improvement and that surgery would not necessarily capitalize on this improvement and can only make her worse  Her decreased  strength may be improved with a hand therapist, and I have discussed that referring her to 1 for instruction on  strength exercises can be helpful and she can perform this multiple times a day on her own  I discussed with her also that we are not missing out on anything and losing time or burning any bridges  We can perform any surgery that is necessary for this condition if it appears in a future appointment  We also discussed that if there is any proximal ulnar nerve compression at the level of the elbow that this would be but benefitted by nighttime splinting and so we provider with instructions on this today    She understands the and is in full agreement with the treatment plan  She will return in 6 weeks for repeat clinical evaluation to assess her  strength and the neuropathic signs she has        Follow Up: 6 weeks     To Do Next Visit: re evaluation of current issue     PROCEDURES PERFORMED:  Procedures  No Procedures performed today    Scribe Attestation    I,:   Wallie Jeans am acting as a scribe while in the presence of the attending physician :        I,:   Ximena Caicedo MD personally performed the services described in this documentation    as scribed in my presence :

## 2019-11-13 ENCOUNTER — EVALUATION (OUTPATIENT)
Dept: PHYSICAL THERAPY | Facility: MEDICAL CENTER | Age: 69
End: 2019-11-13
Payer: COMMERCIAL

## 2019-11-13 DIAGNOSIS — R20.0 NUMBNESS OF LEFT HAND: Primary | ICD-10-CM

## 2019-11-13 PROCEDURE — 97161 PT EVAL LOW COMPLEX 20 MIN: CPT

## 2019-11-13 PROCEDURE — 97140 MANUAL THERAPY 1/> REGIONS: CPT

## 2019-11-13 PROCEDURE — G8984 CARRY CURRENT STATUS: HCPCS

## 2019-11-13 PROCEDURE — G8985 CARRY GOAL STATUS: HCPCS

## 2019-11-13 NOTE — PROGRESS NOTES
PT Evaluation     Today's date: 2019  Patient name: Enmanuel Casper  : 1950  MRN: [id number]  Referring provider: Marvin Landers MD  Dx:   Encounter Diagnosis     ICD-10-CM    1  Numbness of left hand R20 0 Ambulatory referral to PT/OT hand therapy                  Assessment  Assessment details: Pt is a 81year old RHD female who presents to PT with complaints of chronic ulnar neuropathy in her L hand  She complains of constant tingling in tips of her MF to [facility] and progressive weakness in her L hand  Unable to reproduce or worsen her symptoms with provocative tests for the ulnar nerve but was able to confirm weakness in her intrinsics, , and pinch strength  She should benefit from skilled PT to help maintain proper gliding of her ulnar nerve, improve her intrinsic strength, and improve her functioning   Thank you kindly for your referral    Impairments: impaired physical strength and pain with function  Understanding of Dx/Px/POC: good   Prognosis: good    Goals  STG (2-3 weeks)  1: Pt independent in HEP  2: Improve intrinsic strength 1/2 grade    LTG (4 weeks)  1: Improve FOTO 10 pts  2: Improve  15 lbs; pinch 3-5 lbs  3: intrinsic strength 4 to 4+/5    Plan  Plan details: Initiate PT as per POC  Patient would benefit from: skilled physical therapy  Planned therapy interventions: neuromuscular re-education, patient education, strengthening, stretching, therapeutic activities, therapeutic exercise, flexibility, fine motor coordination training, functional ROM exercises and home exercise program  Frequency: 1x week (Pt had tight work schedule)  Duration in visits: 6  Duration in weeks: 6  Plan of Care beginning date: 2019  Plan of Care expiration date: 2019  Treatment plan discussed with: patient        Subjective Evaluation    History of Present Illness  Mechanism of injury: In August had poison torres- had swelling in her hand- had to pull her rings off  September wrist had pain in ulnar wrist  Mf to SF- tingly and numb            Not a recurrent problem   Quality of life: good    Pain  Current pain ratin  At best pain ratin  At worst pain ratin  Quality: dull ache  Relieving factors: rest and support  Progression: improved    Social Support    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[employer])  Hand dominance: right      Diagnostic Tests  MRI studies: normal  EMG: abnormal (compression at Cubital tunnle; mild at carpal tunnel)  Patient Goals  Patient goals for therapy: decreased pain, increased motion, increased strength and independence with ADLs/IADLs          Objective     Neurological Testing     Sensation     Wrist/Hand   Left   Intact: light touch    Comments   Left light touch: 0 07 g all digits     Strength/Myotome Testing     Left Wrist/Hand   Wrist extension: 4+  Wrist flexion: 4+  Radial deviation: 4+  Ulnar deviation: 4+     (2nd hand position)     Trial 1: 35    Thumb Strength  Key/Lateral Pinch     [place] 1: 10  Palmar/Three-Point Pinch     Trial 1: 7    Right Wrist/Hand      (2nd hand position)     Trial 1: 60    Thumb Strength   Key/Lateral Pinch     Trial 1: 20  Palmar/Three-Point Pinch     Trial 1: 16    Additional Strength Details  L ADM 4-/5  Interossei 4-/5  EDM 4-/5          Tests     Left Shoulder   Negative ULTT1 and ULTT2  Left Elbow   Negative elbow flexion and Tinel's sign (cubital tunnel)  Left Wrist/Hand   Positive crossed finger  Negative Froment's sign, Phalen's sign and Tinel's sign (medial nerve)       Additional Tests Details  + crossed finger in SF  - active neural tension in ulnar bias              Flowsheet Rows      Most Recent Value   PT/OT G-Codes   Current Score  50   Projected Score  68   FOTO information reviewed  Yes   Assessment Type  Evaluation   G code set  Carrying, Moving & Handling Objects   Carrying, Moving and Handling Objects Current Status ()  CK   Carrying, Moving and Handling Objects Goal Status ()  CJ             Precautions: onset August 2019  EMG- confirmed L cubital tunnel, mild CTS      Manual              PNG's                                                                     Exercise Diary              Towel bunches with wt             fingerweb digit flexion             digiweb finger spread             Hand helper             flexbar towel twist             Foam squeezes             Plate flip- lateral pinch                                                                                                                                  HEP: putty pinch and pull; roll and pinch, digit add/abd; PNG ulnar bias                                                        Modalities

## 2019-11-19 ENCOUNTER — TELEPHONE (OUTPATIENT)
Dept: OBGYN CLINIC | Facility: HOSPITAL | Age: 69
End: 2019-11-19

## 2019-11-19 ENCOUNTER — OFFICE VISIT (OUTPATIENT)
Dept: PHYSICAL THERAPY | Facility: MEDICAL CENTER | Age: 69
End: 2019-11-19
Payer: COMMERCIAL

## 2019-11-19 DIAGNOSIS — R20.0 NUMBNESS OF LEFT HAND: Primary | ICD-10-CM

## 2019-11-19 PROCEDURE — 97140 MANUAL THERAPY 1/> REGIONS: CPT

## 2019-11-19 PROCEDURE — 97110 THERAPEUTIC EXERCISES: CPT

## 2019-11-19 PROCEDURE — 97112 NEUROMUSCULAR REEDUCATION: CPT

## 2019-11-19 NOTE — PROGRESS NOTES
Daily Note     Today's date: 2019  Patient name: Renato Lopez  : 1950  MRN: [id number]  Referring provider: Jo Jones MD  Dx:   Encounter Diagnosis     ICD-10-CM    1  Numbness of left hand R20 0                   Subjective: Pt reports she feels like her hand is getting stronger with putty exercises  Objective: See treatment diary below      Assessment: Tolerated treatment well  Patient exhibited good technique with therapeutic exercises and would benefit from continued PT Pt has slight improvement in ADM strength; no significant change in interossei strength  Initiated program, pt had some fatigue at the end of session  Plan: Continue per plan of care        Precautions: onset 2019  EMG- confirmed L cubital tunnel, mild CTS      Manual              PNG's 10                                                    10                Exercise Diary              Towel bunches with wt 2 5# 2 min            fingerweb digit flexion Green 30x            digiweb finger spread yellow 30x            Hand helper NP            flexbar towel twist Red 30x            Foam squeezes 30x            Plate flip- lateral pinch 2 min 2 5#            Puppet hand digiball squeeze Red 30x                                                                                                                    HEP: putty pinch and pull; roll and pinch, digit add/abd; PNG ulnar bias                                        10/10                Modalities

## 2019-11-26 ENCOUNTER — APPOINTMENT (OUTPATIENT)
Dept: PHYSICAL THERAPY | Facility: MEDICAL CENTER | Age: 69
End: 2019-11-26
Payer: COMMERCIAL

## 2019-12-03 ENCOUNTER — OFFICE VISIT (OUTPATIENT)
Dept: PHYSICAL THERAPY | Facility: MEDICAL CENTER | Age: 69
End: 2019-12-03
Payer: COMMERCIAL

## 2019-12-03 DIAGNOSIS — R20.0 NUMBNESS OF LEFT HAND: Primary | ICD-10-CM

## 2019-12-03 PROCEDURE — 97140 MANUAL THERAPY 1/> REGIONS: CPT

## 2019-12-03 PROCEDURE — 97010 HOT OR COLD PACKS THERAPY: CPT

## 2019-12-03 PROCEDURE — 97110 THERAPEUTIC EXERCISES: CPT

## 2019-12-03 PROCEDURE — 97112 NEUROMUSCULAR REEDUCATION: CPT

## 2019-12-03 NOTE — PROGRESS NOTES
Daily Note     Today's date: 12/3/2019  Patient name: Rajesh Sanches  : 1950  MRN: [id number]  Referring provider: Peggy Walker MD  Dx:   Encounter Diagnosis     ICD-10-CM    1  Numbness of left hand R20 0                   Subjective: pt reports she used her L hand a lot during the holiday- hosted close to 20 people  Only notices tingling now and then      Objective: See treatment diary below      Assessment: Tolerated treatment well  Patient exhibited good technique with therapeutic exercises and would benefit from continued PT Pt has improved strength in her intrinsics; ADM 4+  Interossei 4 to 4+/5; wrist strength 5/5  Light touch sensation intact  Mild tingling post session  Plan: Continue per plan of care  Progress note during next visit        Precautions: onset 2019  EMG- confirmed L cubital tunnel, mild CTS      Manual  11/19 12/3           PNG's 10 10                                                   10 10               Exercise Diary  11/19 12/3           Towel bunches with wt 2 5# 2 min 2 5# 2 min           fingerweb digit flexion Green 30x Green 30x           digiweb finger spread yellow 30x yellow 30x           Hand helper NP            flexbar towel twist Red 30x Red 30x           Foam squeezes 30x 30x           Plate flip- lateral pinch 2 min 2 5# 2 min 2 5#           Puppet hand digiball squeeze Red 30x Red 30x                                                                                                                   HEP: putty pinch and pull; roll and pinch, digit add/abd; PNG ulnar bias                                        10/10 10/10               Modalities

## 2019-12-10 ENCOUNTER — OFFICE VISIT (OUTPATIENT)
Dept: PHYSICAL THERAPY | Facility: MEDICAL CENTER | Age: 69
End: 2019-12-10
Payer: COMMERCIAL

## 2019-12-10 DIAGNOSIS — R20.0 NUMBNESS OF LEFT HAND: Primary | ICD-10-CM

## 2019-12-10 PROCEDURE — 97010 HOT OR COLD PACKS THERAPY: CPT

## 2019-12-10 PROCEDURE — 97140 MANUAL THERAPY 1/> REGIONS: CPT

## 2019-12-10 PROCEDURE — 97112 NEUROMUSCULAR REEDUCATION: CPT

## 2019-12-10 NOTE — PROGRESS NOTES
Daily Note     Today's date: 12/10/2019  Patient name: Enmanuel Casper  : 1950  MRN: [id number]  Referring provider: Marvin Landers MD  Dx:   Encounter Diagnosis     ICD-10-CM    1  Numbness of left hand R20 0                   Subjective: Pt reports she has been sleeping well at night, less tinging in SF, Feels like she has significant improvement in her strength  Objective: See treatment diary below      Assessment: Tolerated treatment well  Patient exhibited good technique with therapeutic exercises and would benefit from continued PTPt  Able to cross digits without issues; ADM 4+/5  R/L 80/60  LP  (+3)  3-pt 15/10 (+3)  No fatigue with exercises  Reassess fully NV    Plan: Continue per plan of care        Precautions: onset 2019  EMG- confirmed L cubital tunnel, mild CTS      Manual  11/19 12/3 12/10          PNG's 10 10 10                                                  10 10 10              Exercise Diary  11/19 12/3 12/10          Towel bunches with wt 2 5# 2 min 2 5# 2 min 2 5# 2 min           fingerweb digit flexion Green 30x Green 30x Blue 30x          digiweb finger spread yellow 30x yellow 30x yellow 30x          Hand helper NP            flexbar towel twist Red 30x Red 30x Red 30x          Foam squeezes 30x 30x 30x          Plate flip- lateral pinch 2 min 2 5# 2 min 2 5# 2 min 2 5#          Puppet hand digiball squeeze Red 30x Red 30x Green 30x          Grooved pegs   1 board                                                                                                     HEP: putty pinch and pull; roll and pinch, digit add/abd; PNG ulnar bias                                        10/10 10/10 10/15              Modalities

## 2019-12-17 ENCOUNTER — OFFICE VISIT (OUTPATIENT)
Dept: PHYSICAL THERAPY | Facility: MEDICAL CENTER | Age: 69
End: 2019-12-17
Payer: COMMERCIAL

## 2019-12-17 DIAGNOSIS — R20.0 NUMBNESS OF LEFT HAND: Primary | ICD-10-CM

## 2019-12-17 PROCEDURE — G8985 CARRY GOAL STATUS: HCPCS

## 2019-12-17 PROCEDURE — G8984 CARRY CURRENT STATUS: HCPCS

## 2019-12-17 PROCEDURE — 97140 MANUAL THERAPY 1/> REGIONS: CPT

## 2019-12-17 PROCEDURE — 97112 NEUROMUSCULAR REEDUCATION: CPT

## 2019-12-17 NOTE — PROGRESS NOTES
PT Evaluation     Today's date: 2019  Patient name: Casandra Arrington  : 1950  MRN: [id number]  Referring provider: Sadi Bermudez MD  Dx:   Encounter Diagnosis     ICD-10-CM    1  Numbness of left hand R20 0                   Assessment  Assessment details: Pt has improved in her strength in her intrinsics, she reports better functioning of her hand especially with lateral pinch  Pt reports she still has tingling in her 3 ulnar digits but less than before  She has an extensive home program to help her continue to make progress  Recommend D/C to home program or continue with physician's discretion     Impairments: impaired physical strength and pain with function  Understanding of Dx/Px/POC: good   Prognosis: good    Goals  STG (2-3 weeks)  1: Pt independent in HEP- met  2: Improve intrinsic strength 1/2 grade- met    LTG (4 weeks)  1: Improve FOTO 10 pts- met  2: Improve  15 lbs; pinch 3-5 lbs- met  3: intrinsic strength 4 to 4+/5- met    Plan  Plan details: Continue PT as per POC  Patient would benefit from: skilled physical therapy  Planned therapy interventions: neuromuscular re-education, patient education, strengthening, stretching, therapeutic activities, therapeutic exercise, flexibility, fine motor coordination training, functional ROM exercises and home exercise program  Plan of Care beginning date: 2019  Plan of Care expiration date: 2019  Treatment plan discussed with: patient        Subjective Evaluation    History of Present Illness  Mechanism of injury: In August had poison torres- had swelling in her hand- had to pull her rings off  September wrist had pain in ulnar wrist  Mf to SF- tingly and numb  Re-eval: less tingling in SF, still some numbness in MF, RF  Feels like hand is a bit stronger, better manipulation with thumb and digits            Not a recurrent problem   Quality of life: good    Pain  Current pain ratin  At best pain ratin  At worst pain rating: 2  Quality: dull ache  Relieving factors: rest and support  Progression: improved    Social Support    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health)  Hand dominance: right      Diagnostic Tests  MRI studies: normal  EMG: abnormal (compression at Cubital tunnle; mild at carpal tunnel)  Patient Goals  Patient goals for therapy: decreased pain, increased motion, increased strength and independence with ADLs/IADLs          Objective     Neurological Testing     Sensation     Wrist/Hand   Left   Intact: light touch    Comments   Left light touch: 0 07 g all digits     Strength/Myotome Testing     Left Wrist/Hand   Wrist extension: 4+  Wrist flexion: 4+  Radial deviation: 4+  Ulnar deviation: 4+     (2nd hand position)     Trial 1: 60    Thumb Strength  Key/Lateral Pinch     [place] 1: 13  Palmar/Three-Point Pinch     Trial 1: 10    Right Wrist/Hand      (2nd hand position)     Trial 1: 80    Thumb Strength   Key/Lateral Pinch     Trial 1: 20  Palmar/Three-Point Pinch     Trial 1: 16    Additional Strength Details  L ADM 4+/5  Interossei 4+/5  EDM 4/5          Tests     Left Shoulder   Negative ULTT1 and ULTT2  Left Elbow   Negative elbow flexion and Tinel's sign (cubital tunnel)  Left Wrist/Hand   Negative crossed finger, Froment's sign, Phalen's sign and Tinel's sign (medial nerve)       Additional Tests Details  Pt able to cross fingers where she wasn't able to before  - active neural tension in ulnar bias                     Precautions: onset August 2019  EMG- confirmed L cubital tunnel, mild CTS      Manual  11/19 12/3 12/10 12/17         PNG's 10 10 10 10 + reassessment                                                 10 10 10 10             Exercise Diary  11/19 12/3 12/10 12/17         Towel bunches with wt 2 5# 2 min 2 5# 2 min 2 5# 2 min  2 5# 2 min         fingerweb digit flexion Green 30x Green 30x Blue 30x Blue 30x         digiweb finger spread yellow 30x yellow 30x yellow 30x yellow 30x Hand helper NP            flexbar towel twist Red 30x Red 30x Red 30x Green 30x         Foam squeezes 30x 30x 30x          Plate flip- lateral pinch 2 min 2 5# 2 min 2 5# 2 min 2 5# 2 min 2 5#         Puppet hand digiball squeeze Red 30x Red 30x Green 30x Green 30x         Grooved pegs   1 board 1 board                                                                                                    HEP: putty pinch and pull; roll and pinch, digit add/abd; PNG ulnar bias                                        10/10 10/10 10/15 10/10             Modalities

## 2019-12-26 ENCOUNTER — TELEPHONE (OUTPATIENT)
Dept: OBGYN CLINIC | Facility: CLINIC | Age: 69
End: 2019-12-26

## 2019-12-30 ENCOUNTER — TELEPHONE (OUTPATIENT)
Dept: OBGYN CLINIC | Facility: CLINIC | Age: 69
End: 2019-12-30

## 2020-01-08 ENCOUNTER — OFFICE VISIT (OUTPATIENT)
Dept: OBGYN CLINIC | Facility: MEDICAL CENTER | Age: 70
End: 2020-01-08
Payer: COMMERCIAL

## 2020-01-08 VITALS
HEIGHT: 63 IN | BODY MASS INDEX: 40.57 KG/M2 | WEIGHT: 229 LBS | DIASTOLIC BLOOD PRESSURE: 83 MMHG | HEART RATE: 81 BPM | SYSTOLIC BLOOD PRESSURE: 133 MMHG

## 2020-01-08 DIAGNOSIS — R20.0 NUMBNESS OF LEFT HAND: Primary | ICD-10-CM

## 2020-01-08 PROCEDURE — 99213 OFFICE O/P EST LOW 20 MIN: CPT | Performed by: ORTHOPAEDIC SURGERY

## 2020-01-08 NOTE — PROGRESS NOTES
Chief Complaint     Left hand numbness      History of Present Illness     Rajesh Sanches is a 81 y o  female presents the office today for follow-up evaluation of her left hand numbness and tingling  Patient states since her last visit she has been compliant attending PT/OT hand therapy  She states this greatly resolved her symptoms  Occasionally she notes numbness to her fingers when picking up objects  However she is overall happy with the progress she made in therapy  She continues to make improvement with her  strength  She continues a home exercise program from PT/OT hand therapy  At this point in time 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  Past Medical History:   Diagnosis Date    Ulnar neuritis, left        Past Surgical History:   Procedure Laterality Date    TONSILLECTOMY         Allergies   Allergen Reactions    Latex        Current Outpatient Medications on File Prior to Visit   Medication Sig Dispense Refill    celecoxib (CeleBREX) 200 mg capsule Take 1 capsule (200 mg total) by mouth daily (Patient not taking: Reported on 10/28/2019) 30 capsule 1    meloxicam (MOBIC) 7 5 mg tablet Take 1 tablet (7 5 mg total) by mouth daily (Patient not taking: Reported on 11/6/2019) 30 tablet 1     No current facility-administered medications on file prior to visit  Social History     Tobacco Use    Smoking status: Never Smoker    Smokeless tobacco: Never Used   Substance Use Topics    Alcohol use: Yes    Drug use: Not Currently       History reviewed  No pertinent family history  Review of Systems     As stated in the HPI  All other systems were reviewed and are negative  Physical Exam     /83   Pulse 81   Ht 5' 3" (1 6 m)   Wt 104 kg (229 lb)   BMI 40 57 kg/m²     GENERAL: This is a well-developed, well-nourished, age-appropriate patient in no acute distress  The patient is alert and oriented x3   Pleasant and cooperative  Eyes: Anicteric sclerae  Extraocular movements appear intact  HENT: Nares are patent with no drainage  Lungs: There is equal chest rise on inspection  Breathing is non-labored with no audible wheezing  Cardiovascular: No cyanosis  No upper extremity lymphadema  Skin: Skin is warm to touch  No obvious skin lesions or rashes other than described below  Neurologic: No ataxia  Psychiatric: Mood and affect are appropriate  Left hand  Skin intact  No erythema or ecchymosis noted   Minimal swelling noted to the volar aspect of the wrist  FDI 5/5   Negative tinels at cubital tunnel   Negative Tinel's at the carpal tunnel  5/5 Motor to the APB, FDI, FDP2, FDP5, EDC  Sensation intact to light touch in the median, radial, and ulnar nerve distribution  Data Review     Results Reviewed     None             Imaging:  No new images to review    Assessment and Plan      Diagnoses and all orders for this visit:    Numbness of left hand             44-year-old female with left hand numbness  Happy to see that she is overall doing well today in the office  This point time I would not recommend any further intervention  She may continue with her home exercise program as prescribed by PT/OT hand therapy  At this time she may return to work full duty  Patient was provided with a work note stating this today        Follow Up: PRN    To Do Next Visit:     PROCEDURES PERFORMED:  Procedures  No Procedures performed today     Scribe Attestation    I,:   Divine Lewis MA am acting as a scribe while in the presence of the attending physician :        I,:   Cindy Hassan MD personally performed the services described in this documentation    as scribed in my presence :

## 2020-01-08 NOTE — LETTER
January 8, 2020     Patient: Enmanuel Casper   YOB: 1950   Date of Visit: 1/8/2020       To Whom it May Concern:    Enmanuel Casper is under my professional care  She was seen in my office on 1/8/2020  She may return to work on 1/9/20 with no restrictions  If you have any questions or concerns, please don't hesitate to call           Sincerely,          Braeden Rainey MD        CC: No Recipients

## 2021-03-25 ENCOUNTER — OFFICE VISIT (OUTPATIENT)
Dept: FAMILY MEDICINE CLINIC | Facility: CLINIC | Age: 71
End: 2021-03-25
Payer: COMMERCIAL

## 2021-03-25 VITALS
TEMPERATURE: 97.5 F | BODY MASS INDEX: 40.22 KG/M2 | HEIGHT: 63 IN | DIASTOLIC BLOOD PRESSURE: 68 MMHG | SYSTOLIC BLOOD PRESSURE: 120 MMHG | RESPIRATION RATE: 16 BRPM | HEART RATE: 80 BPM | WEIGHT: 227 LBS

## 2021-03-25 DIAGNOSIS — Z12.11 SCREENING FOR COLORECTAL CANCER: ICD-10-CM

## 2021-03-25 DIAGNOSIS — E66.01 CLASS 3 SEVERE OBESITY DUE TO EXCESS CALORIES WITHOUT SERIOUS COMORBIDITY WITH BODY MASS INDEX (BMI) OF 40.0 TO 44.9 IN ADULT (HCC): ICD-10-CM

## 2021-03-25 DIAGNOSIS — Z12.12 SCREENING FOR COLORECTAL CANCER: ICD-10-CM

## 2021-03-25 DIAGNOSIS — Z13.29 SCREENING FOR HYPOTHYROIDISM: ICD-10-CM

## 2021-03-25 DIAGNOSIS — Z12.31 SCREENING MAMMOGRAM, ENCOUNTER FOR: ICD-10-CM

## 2021-03-25 DIAGNOSIS — Z13.220 SCREENING FOR HYPERLIPIDEMIA: ICD-10-CM

## 2021-03-25 DIAGNOSIS — H26.9 CATARACT OF BOTH EYES, UNSPECIFIED CATARACT TYPE: Primary | ICD-10-CM

## 2021-03-25 PROBLEM — E66.813 CLASS 3 SEVERE OBESITY DUE TO EXCESS CALORIES WITHOUT SERIOUS COMORBIDITY WITH BODY MASS INDEX (BMI) OF 40.0 TO 44.9 IN ADULT (HCC): Status: ACTIVE | Noted: 2021-03-25

## 2021-03-25 PROCEDURE — 1036F TOBACCO NON-USER: CPT | Performed by: NURSE PRACTITIONER

## 2021-03-25 PROCEDURE — 3008F BODY MASS INDEX DOCD: CPT | Performed by: NURSE PRACTITIONER

## 2021-03-25 PROCEDURE — 99204 OFFICE O/P NEW MOD 45 MIN: CPT | Performed by: NURSE PRACTITIONER

## 2021-03-25 PROCEDURE — 1101F PT FALLS ASSESS-DOCD LE1/YR: CPT | Performed by: NURSE PRACTITIONER

## 2021-03-25 PROCEDURE — 3725F SCREEN DEPRESSION PERFORMED: CPT | Performed by: NURSE PRACTITIONER

## 2021-03-25 PROCEDURE — 3288F FALL RISK ASSESSMENT DOCD: CPT | Performed by: NURSE PRACTITIONER

## 2021-03-25 PROCEDURE — 1160F RVW MEDS BY RX/DR IN RCRD: CPT | Performed by: NURSE PRACTITIONER

## 2021-03-25 NOTE — PROGRESS NOTES
Assessment/Plan:    Mammogram ordered  Cologuard ordered  Routine lab work including a lipid panel, TSH, CMP and CBC   Pt declined the Prevnar vaccine today  She also declined to schedule the COVID vaccine     Cataract of both eyes  Pt is tentatively scheduled for cataract surgery B/L the end of April  She will schedule a preop clearance with our office if she decides to proceed with this surgery     Class 3 severe obesity due to excess calories without serious comorbidity with body mass index (BMI) of 40 0 to 44 9 in [facility])  Encouraged life style modifications including regular physical activity and dietary modifications        Diagnoses and all orders for this visit:    Cataract of both eyes, unspecified cataract type    Screening mammogram, encounter for  -     Mammo screening bilateral w cad; Future    Screening for colorectal cancer  -     Cologuard; Future    Class 3 severe obesity due to excess calories without serious comorbidity with body mass index (BMI) of 40 0 to 44 9 in [facility])  -     Comprehensive metabolic panel; Future  -     CBC and differential; Future  -     TSH, 3rd generation with Free T4 reflex; Future  -     Lipid panel; Future    Screening for hyperlipidemia  -     Lipid panel; Future    Screening for hypothyroidism  -     TSH, 3rd generation with Free T4 reflex; Future    Other orders  -     Cancel: Hepatitis C Antibody ([facility]); Future  -     Cancel: Cologuard; Future          Subjective:      Patient ID: Steve Chopra is a 73 y o  female  HPI     Pt presents by herself today to re-establish care with our office  Last seen here several years ago  She has not been following with any other PCP offices    She typically has a biometric screen via the [employer] (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[employer])- didn't have this done this past year due to [address] 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 [employer]   Has 3 children, her middle daughter is expecting a baby in May     Non smoker  Rare ETOH use     She is tentatively scheduled for cataract surgery the end of April with Dr Sabino Andersen, although she isn't sure if she is going to have this done/  If she does decide to proceed with surgery, she will need a preop clearance which prompted her visit today      She offers no acute complaints or concerns  Feels well overall  Denies CP, palpitations, edema, SOB, dizziness    Mammogram: years ago  CRC: none previously   Prevnar/ Pneumovax: has never had these previously     The following portions of the patient's history were reviewed and updated as appropriate: allergies, current medications, past family history, past medical history, past social history, past surgical history and problem list     Review of Systems   Constitutional: Negative for chills, fatigue and fever  Respiratory: Negative for cough, shortness of breath and wheezing  Cardiovascular: Negative for chest pain, palpitations and leg swelling  Gastrointestinal: Negative for abdominal pain, blood in stool, constipation, diarrhea and nausea  Genitourinary: Negative for dysuria  Musculoskeletal: Negative for arthralgias and myalgias  Skin: Negative for rash and wound  Neurological: Negative for dizziness, weakness, numbness and headaches  Psychiatric/Behavioral: Negative for sleep disturbance and suicidal ideas  The patient is not nervous/anxious  Objective:      /68   Pulse 80   Temp 97 5 °F (36 4 °C) (Tympanic)   Resp 16   Ht 5' 3" (1 6 m)   Wt 103 kg (227 lb)   BMI 40 21 kg/m²          Physical Exam  Constitutional:       General: She is not in acute distress  Appearance: She is well-developed  She is obese  She is not ill-appearing, toxic-appearing or diaphoretic  HENT:      Head: Normocephalic and atraumatic  Eyes:      Extraocular Movements: Extraocular movements intact        Conjunctiva/sclera: Conjunctivae normal       Pupils: Pupils are equal, round, and reactive to light    Neck:      Musculoskeletal: Normal range of motion and neck supple  No neck rigidity or muscular tenderness  Thyroid: No thyromegaly  Vascular: No carotid bruit  Cardiovascular:      Rate and Rhythm: Normal rate and regular rhythm  Heart sounds: Normal heart sounds  No murmur  Pulmonary:      Effort: Pulmonary effort is normal  No respiratory distress  Breath sounds: Normal breath sounds  No wheezing  Musculoskeletal: Normal range of motion  Right lower leg: No edema  Left lower leg: No edema  Lymphadenopathy:      Cervical: No cervical adenopathy  Skin:     General: Skin is warm and dry  Neurological:      General: No focal deficit present  Mental Status: She is alert and oriented to person, place, and time  Psychiatric:         Mood and Affect: Mood normal          Behavior: Behavior normal          Thought Content: Thought content normal          Judgment: Judgment normal          BMI Counseling: Body mass index is 40 21 kg/m²  The BMI is above normal  Nutrition recommendations include moderation in carbohydrate intake  Exercise recommendations include moderate physical activity 150 minutes/week

## 2021-03-26 NOTE — ASSESSMENT & PLAN NOTE
Pt is tentatively scheduled for cataract surgery B/L the end of April    She will schedule a preop clearance with our office if she decides to proceed with this surgery

## 2021-03-30 DIAGNOSIS — Z23 ENCOUNTER FOR IMMUNIZATION: ICD-10-CM

## 2021-04-09 ENCOUNTER — RA CDI HCC (OUTPATIENT)
Dept: OTHER | Facility: HOSPITAL | Age: 71
End: 2021-04-09

## 2021-04-09 NOTE — PROGRESS NOTES
[facility] 75  coding oppertunities          Chart reviewed, no opportunity found: CHART REVIEWED, NO OPPORTUNITY FOUND

## 2021-07-14 ENCOUNTER — VBI (OUTPATIENT)
Dept: ADMINISTRATIVE | Facility: OTHER | Age: 71
End: 2021-07-14

## 2021-07-27 LAB
ALBUMIN SERPL-MCNC: 4.4 G/DL (ref 3.6–5.1)
ALBUMIN/GLOB SERPL: 1.7 (CALC) (ref 1–2.5)
ALP SERPL-CCNC: 61 U/L (ref 37–153)
ALT SERPL-CCNC: 15 U/L (ref 6–29)
AST SERPL-CCNC: 14 U/L (ref 10–35)
BASOPHILS # BLD AUTO: 60 CELLS/UL (ref 0–200)
BASOPHILS NFR BLD AUTO: 0.9 %
BILIRUB SERPL-MCNC: 0.6 MG/DL (ref 0.2–1.2)
BUN SERPL-MCNC: 18 MG/DL (ref 7–25)
BUN/CREAT SERPL: ABNORMAL (CALC) (ref 6–22)
CALCIUM SERPL-MCNC: 9.9 MG/DL (ref 8.6–10.4)
CHLORIDE SERPL-SCNC: 105 MMOL/L (ref 98–110)
CHOLEST SERPL-MCNC: 202 MG/DL
CHOLEST/HDLC SERPL: 4.7 (CALC)
CO2 SERPL-SCNC: 31 MMOL/L (ref 20–32)
CREAT SERPL-MCNC: 0.84 MG/DL (ref 0.5–0.99)
EOSINOPHIL # BLD AUTO: 114 CELLS/UL (ref 15–500)
EOSINOPHIL NFR BLD AUTO: 1.7 %
ERYTHROCYTE [DISTWIDTH] IN BLOOD BY AUTOMATED COUNT: 13.9 % (ref 11–15)
GLOBULIN SER CALC-MCNC: 2.6 G/DL (CALC) (ref 1.9–3.7)
GLUCOSE SERPL-MCNC: 109 MG/DL (ref 65–99)
HCT VFR BLD AUTO: 40 % (ref 35–45)
HDLC SERPL-MCNC: 43 MG/DL
HGB BLD-MCNC: 13.3 G/DL (ref 11.7–15.5)
LDLC SERPL CALC-MCNC: 134 MG/DL (CALC)
LYMPHOCYTES # BLD AUTO: 2961 CELLS/UL (ref 850–3900)
LYMPHOCYTES NFR BLD AUTO: 44.2 %
MCH RBC QN AUTO: 29.6 PG (ref 27–33)
MCHC RBC AUTO-ENTMCNC: 33.3 G/DL (ref 32–36)
MCV RBC AUTO: 89.1 FL (ref 80–100)
MONOCYTES # BLD AUTO: 496 CELLS/UL (ref 200–950)
MONOCYTES NFR BLD AUTO: 7.4 %
NEUTROPHILS # BLD AUTO: 3069 CELLS/UL (ref 1500–7800)
NEUTROPHILS NFR BLD AUTO: 45.8 %
NONHDLC SERPL-MCNC: 159 MG/DL (CALC)
PLATELET # BLD AUTO: 131 THOUSAND/UL (ref 140–400)
PMV BLD REES-ECKER: 11.1 FL (ref 7.5–12.5)
POTASSIUM SERPL-SCNC: 4.9 MMOL/L (ref 3.5–5.3)
PROT SERPL-MCNC: 7 G/DL (ref 6.1–8.1)
RBC # BLD AUTO: 4.49 MILLION/UL (ref 3.8–5.1)
SL AMB EGFR AFRICAN AMERICAN: 82 ML/MIN/1.73M2
SL AMB EGFR NON AFRICAN AMERICAN: 71 ML/MIN/1.73M2
SODIUM SERPL-SCNC: 142 MMOL/L (ref 135–146)
TRIGL SERPL-MCNC: 141 MG/DL
TSH SERPL-ACNC: 2.35 MIU/L (ref 0.4–4.5)
WBC # BLD AUTO: 6.7 THOUSAND/UL (ref 3.8–10.8)

## 2021-11-16 ENCOUNTER — TELEPHONE (OUTPATIENT)
Dept: FAMILY MEDICINE CLINIC | Facility: CLINIC | Age: 71
End: 2021-11-16

## 2022-02-07 ENCOUNTER — RA CDI HCC (OUTPATIENT)
Dept: OTHER | Facility: HOSPITAL | Age: 72
End: 2022-02-07

## 2022-02-07 NOTE — PROGRESS NOTES
The following dx found on active problem list - please assess using MEAT for  billing    Class 3 severe obesity due to excess calories without serious comorbidity with body mass index (BMI) of 40 0 to 44 9 in adult ([facility] [address] ) [E66 01, Z68 41]    [facility] 75  coding opportunities          Number of diagnosis code(s) already on the problem list added to  fla                     Patients insurance company: [employer] (Medicare Advantage and Commercial)             [place] [address]  coding opportunities          Number of diagnosis code(s) already on the problem list added to [facility] fla               Number of suggestions used: 1         Patients insurance company: [employer] (Medicare Advantage and Commercial)     Visit status: Patient arrived for their scheduled appointment

## 2022-02-10 ENCOUNTER — OFFICE VISIT (OUTPATIENT)
Dept: FAMILY MEDICINE CLINIC | Facility: CLINIC | Age: 72
End: 2022-02-10
Payer: COMMERCIAL

## 2022-02-10 VITALS
WEIGHT: 218.2 LBS | OXYGEN SATURATION: 97 % | RESPIRATION RATE: 16 BRPM | BODY MASS INDEX: 38.66 KG/M2 | HEART RATE: 76 BPM | DIASTOLIC BLOOD PRESSURE: 78 MMHG | SYSTOLIC BLOOD PRESSURE: 116 MMHG | TEMPERATURE: 97.1 F | HEIGHT: 63 IN

## 2022-02-10 DIAGNOSIS — R73.01 IFG (IMPAIRED FASTING GLUCOSE): ICD-10-CM

## 2022-02-10 DIAGNOSIS — E66.01 CLASS 3 SEVERE OBESITY DUE TO EXCESS CALORIES WITHOUT SERIOUS COMORBIDITY WITH BODY MASS INDEX (BMI) OF 40.0 TO 44.9 IN ADULT (HCC): ICD-10-CM

## 2022-02-10 DIAGNOSIS — Z01.818 PREOPERATIVE CLEARANCE: Primary | ICD-10-CM

## 2022-02-10 DIAGNOSIS — H26.9 CATARACT OF BOTH EYES, UNSPECIFIED CATARACT TYPE: ICD-10-CM

## 2022-02-10 DIAGNOSIS — Z12.31 SCREENING MAMMOGRAM FOR BREAST CANCER: ICD-10-CM

## 2022-02-10 PROCEDURE — 99214 OFFICE O/P EST MOD 30 MIN: CPT | Performed by: NURSE PRACTITIONER

## 2022-02-10 PROCEDURE — 3008F BODY MASS INDEX DOCD: CPT | Performed by: NURSE PRACTITIONER

## 2022-02-10 PROCEDURE — 1160F RVW MEDS BY RX/DR IN RCRD: CPT | Performed by: NURSE PRACTITIONER

## 2022-02-10 PROCEDURE — 1036F TOBACCO NON-USER: CPT | Performed by: NURSE PRACTITIONER

## 2022-02-10 NOTE — PROGRESS NOTES
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[employer], pt is an NP)  She has a [address] trip planned with her family for late March which she is looking forward to    No acute concerns today, feels well  Denies CP, palpitations, edema, SOB, dizziness, headaches, N/V/D    The following portions of the patient's history were reviewed and updated as appropriate: allergies, current medications, past family history, past medical history, past social history, past surgical history and problem list     Review of Systems  Constitutional: negative  Eyes: negative  Ears, nose, mouth, throat, and face: negative  Respiratory: negative  Cardiovascular: negative  Gastrointestinal: negative  Genitourinary:negative  Integument/breast: negative  Hematologic/lymphatic: negative  Musculoskeletal:negative  Neurological: negative  Behavioral/Psych: negative  Endocrine: negative  Allergic/Immunologic: negative     Objective:     /78   Pulse 76   Temp (!) 97 1 °F (36 2 °C) (Tympanic)   Resp 16   Ht 5' 2 5" (1 588 m)   Wt 99 kg (218 lb 3 2 oz)   SpO2 97%   BMI 39 27 kg/m²   General appearance: alert and oriented, in no acute distress  Head: Normocephalic, without obvious abnormality, atraumatic  Neck: no adenopathy, no carotid bruit, no JVD, supple, symmetrical, trachea midline and thyroid not enlarged, symmetric, no tenderness/mass/nodules  Back: symmetric, no curvature  ROM normal  No CVA tenderness    Lungs: clear to auscultation bilaterally  Heart: regular rate and rhythm, S1, S2 normal, no murmur, click, rub or gallop  Abdomen: soft, non-tender; bowel sounds normal; no masses,  no organomegaly  Extremities: extremities normal, warm and well-perfused; no cyanosis, clubbing, or edema  Pulses: 2+ and symmetric  Skin: Skin color, texture, turgor normal  No rashes or lesions  Lymph nodes: Cervical, supraclavicular, and axillary nodes normal   Neurologic: Grossly normal    Predictors of intubation difficulty:   Morbid obesity? no   Anatomically abnormal facies? no   Prominent incisors? no   Receding mandible? no   Short, thick neck? no    Assessment:     73 y o  female with planned surgery as above  Known risk factors for perioperative complications: None    Difficulty with intubation is not anticipated  Pt is at a low risk for her planned cataract surgeries  She is medically cleared  Plan:     1  Preoperative workup as follows none  2  Change in medication regimen before surgery: Pt does not currently take any medications  3  Pt requested routine lab work and a mammogram ordered today  Mammogram is due 11/2023    Lab work was last completed July 2021 and she can wait for repeat labs until July of 2022

## 2022-10-25 ENCOUNTER — TELEPHONE (OUTPATIENT)
Dept: FAMILY MEDICINE CLINIC | Facility: CLINIC | Age: 72
End: 2022-10-25

## 2022-10-25 NOTE — TELEPHONE ENCOUNTER
----- Message from [facility] sent at 10/24/2022 10:18 AM EDT -----  Regarding: FW: semaglutide for weight loss  Please contact patient to help her schedule follow up with Eugenio Carson  ----- Message -----  From: Macrina Triana  Sent: 10/24/2022   9:36 AM EDT  To: [facility]  Subject: semaglutide for weight loss                      Thanks Aroldo Soto,    I will have the blood work done and then make an appointment  Can I make an appointment with you?     MAXIM Das

## 2022-10-28 LAB
ALBUMIN SERPL-MCNC: 4.3 G/DL (ref 3.6–5.1)
ALBUMIN/GLOB SERPL: 1.6 (CALC) (ref 1–2.5)
ALP SERPL-CCNC: 63 U/L (ref 37–153)
ALT SERPL-CCNC: 10 U/L (ref 6–29)
AST SERPL-CCNC: 12 U/L (ref 10–35)
BASOPHILS # BLD AUTO: 42 CELLS/UL (ref 0–200)
BASOPHILS NFR BLD AUTO: 0.7 %
BILIRUB SERPL-MCNC: 0.6 MG/DL (ref 0.2–1.2)
BUN SERPL-MCNC: 14 MG/DL (ref 7–25)
BUN/CREAT SERPL: NORMAL (CALC) (ref 6–22)
CALCIUM SERPL-MCNC: 9.7 MG/DL (ref 8.6–10.4)
CHLORIDE SERPL-SCNC: 104 MMOL/L (ref 98–110)
CHOLEST SERPL-MCNC: 198 MG/DL
CHOLEST/HDLC SERPL: 4 (CALC)
CO2 SERPL-SCNC: 32 MMOL/L (ref 20–32)
CREAT SERPL-MCNC: 0.86 MG/DL (ref 0.6–1)
EOSINOPHIL # BLD AUTO: 90 CELLS/UL (ref 15–500)
EOSINOPHIL NFR BLD AUTO: 1.5 %
ERYTHROCYTE [DISTWIDTH] IN BLOOD BY AUTOMATED COUNT: 12.5 % (ref 11–15)
GFR/BSA.PRED SERPLBLD CYS-BASED-ARV: 73 ML/MIN/1.73M2
GLOBULIN SER CALC-MCNC: 2.7 G/DL (CALC) (ref 1.9–3.7)
GLUCOSE SERPL-MCNC: 94 MG/DL (ref 65–99)
HBA1C MFR BLD: 5.3 % OF TOTAL HGB
HCT VFR BLD AUTO: 38.7 % (ref 35–45)
HDLC SERPL-MCNC: 49 MG/DL
HGB BLD-MCNC: 12.9 G/DL (ref 11.7–15.5)
LDLC SERPL CALC-MCNC: 124 MG/DL (CALC)
LYMPHOCYTES # BLD AUTO: 2274 CELLS/UL (ref 850–3900)
LYMPHOCYTES NFR BLD AUTO: 37.9 %
MCH RBC QN AUTO: 29.9 PG (ref 27–33)
MCHC RBC AUTO-ENTMCNC: 33.3 G/DL (ref 32–36)
MCV RBC AUTO: 89.6 FL (ref 80–100)
MONOCYTES # BLD AUTO: 342 CELLS/UL (ref 200–950)
MONOCYTES NFR BLD AUTO: 5.7 %
NEUTROPHILS # BLD AUTO: 3252 CELLS/UL (ref 1500–7800)
NEUTROPHILS NFR BLD AUTO: 54.2 %
NONHDLC SERPL-MCNC: 149 MG/DL (CALC)
PLATELET # BLD AUTO: 261 THOUSAND/UL (ref 140–400)
PMV BLD REES-ECKER: 10.4 FL (ref 7.5–12.5)
POTASSIUM SERPL-SCNC: 4.4 MMOL/L (ref 3.5–5.3)
PROT SERPL-MCNC: 7 G/DL (ref 6.1–8.1)
RBC # BLD AUTO: 4.32 MILLION/UL (ref 3.8–5.1)
SODIUM SERPL-SCNC: 141 MMOL/L (ref 135–146)
TRIGL SERPL-MCNC: 141 MG/DL
TSH SERPL-ACNC: 1.47 MIU/L (ref 0.4–4.5)
WBC # BLD AUTO: 6 THOUSAND/UL (ref 3.8–10.8)

## 2022-10-31 ENCOUNTER — RA CDI HCC (OUTPATIENT)
Dept: OTHER | Facility: HOSPITAL | Age: 72
End: 2022-10-31

## 2022-10-31 NOTE — PROGRESS NOTES
[facility]  coding opportunities       Chart reviewed, no opportunity found: CHART REVIEWED, NO OPPORTUNITY FOUND        Patients Insurance        Commercial Insurance: [address] [place] [address]

## 2022-11-01 ENCOUNTER — OFFICE VISIT (OUTPATIENT)
Dept: FAMILY MEDICINE CLINIC | Facility: CLINIC | Age: 72
End: 2022-11-01

## 2022-11-01 ENCOUNTER — TELEPHONE (OUTPATIENT)
Dept: FAMILY MEDICINE CLINIC | Facility: CLINIC | Age: 72
End: 2022-11-01

## 2022-11-01 VITALS
DIASTOLIC BLOOD PRESSURE: 88 MMHG | TEMPERATURE: 97.6 F | BODY MASS INDEX: 40.08 KG/M2 | HEIGHT: 63 IN | WEIGHT: 226.2 LBS | HEART RATE: 84 BPM | RESPIRATION RATE: 20 BRPM | OXYGEN SATURATION: 96 % | SYSTOLIC BLOOD PRESSURE: 130 MMHG

## 2022-11-01 DIAGNOSIS — Z78.0 POST-MENOPAUSAL: ICD-10-CM

## 2022-11-01 DIAGNOSIS — Z00.00 ANNUAL PHYSICAL EXAM: Primary | ICD-10-CM

## 2022-11-01 DIAGNOSIS — E66.01 CLASS 3 SEVERE OBESITY DUE TO EXCESS CALORIES WITHOUT SERIOUS COMORBIDITY WITH BODY MASS INDEX (BMI) OF 40.0 TO 44.9 IN ADULT (HCC): ICD-10-CM

## 2022-11-01 DIAGNOSIS — E66.01 CLASS 3 SEVERE OBESITY DUE TO EXCESS CALORIES WITHOUT SERIOUS COMORBIDITY WITH BODY MASS INDEX (BMI) OF 40.0 TO 44.9 IN ADULT (HCC): Primary | ICD-10-CM

## 2022-11-01 RX ORDER — SEMAGLUTIDE 1.34 MG/ML
0.25 INJECTION, SOLUTION SUBCUTANEOUS WEEKLY
Qty: 6 ML | Refills: 0 | Status: SHIPPED | OUTPATIENT
Start: 2022-11-01

## 2022-11-01 NOTE — ASSESSMENT & PLAN NOTE
The patient continues to struggle with her weight  She has gained 8 lb since February  She does exercise with a treadmill however states that she recently was not using it due to it being broken  She states that she has had weight issues her entire life  She has tried multiple diets with weight loss however she did gain this weight back  She is interested in injectable medication for weight loss  Medications were discussed with the patient  I did make the patient aware that I am unsure if her insurance will cover the medications and they are quite costly  Side effects were discussed  Initially I did send liraglutide to her pharmacy however this is not covered  I will send semaglutide to her pharmacy  I did send the patient a message through [address] making her aware that this is a weekly injection and that the side effect profile is the same

## 2022-11-01 NOTE — PROGRESS NOTES
[address] PRACTICE    NAME: Chiquita Izaguirre  AGE: 81 y o  SEX: female  : 1950      DATE: 2022     Assessment and Plan:     Problem List Items Addressed This Visit        Other    Class 3 severe obesity due to excess calories without serious comorbidity with body mass index (BMI) of 40 0 to 44 9 in [facility])     The patient continues to struggle with her weight  She has gained 8 lb since February  She does exercise with a treadmill however states that she recently was not using it due to it being broken  She states that she has had weight issues her entire life  She has tried multiple diets with weight loss however she did gain this weight back  She is interested in injectable medication for weight loss  Medications were discussed with the patient  I did make the patient aware that I am unsure if her insurance will cover the medications and they are quite costly  Side effects were discussed  Initially I did send liraglutide to her pharmacy however this is not covered  I will send semaglutide to her pharmacy  I did send the patient a message through [address] making her aware that this is a weekly injection and that the side effect profile is the same  Relevant Medications    Semaglutide-Weight Management (WEGOVY) 0 25 MG/0 5ML      Other Visit Diagnoses     Annual physical exam    -  Primary    Post-menopausal        Relevant Orders    DXA bone density spine hip and pelvis          Immunizations and preventive care screenings were discussed with patient today  Appropriate education was printed on patient's after visit summary  Counseling:  Alcohol/drug use: discussed moderation in alcohol intake, the recommendations for healthy alcohol use, and avoidance of illicit drug use  Dental Health: discussed importance of regular tooth brushing, flossing, and dental visits    Injury prevention: discussed safety/seat belts, safety helmets, smoke detectors, carbon dioxide detectors, and smoking near bedding or upholstery  Sexual health: discussed sexually transmitted diseases, partner selection, use of condoms, avoidance of unintended pregnancy, and contraceptive alternatives  Exercise: the importance of regular exercise/physical activity was discussed  Recommend exercise 3-5 times per week for at least 30 minutes  BMI Counseling: Body mass index is 40 38 kg/m²  The BMI is above normal  Nutrition recommendations include decreasing portion sizes, encouraging healthy choices of fruits and vegetables, consuming healthier snacks, limiting drinks that contain sugar, moderation in carbohydrate intake, increasing intake of lean protein, reducing intake of saturated and trans fat and reducing intake of cholesterol  Exercise recommendations include moderate physical activity 150 minutes/week and exercising 3-5 times per week  Rationale for BMI follow-up plan is due to patient being overweight or obese  Depression Screening and Follow-up Plan: Patient was screened for depression during today's encounter  They screened negative with a PHQ-2 score of 0  Return in about 6 months (around 5/1/2023)  Chief Complaint:     Chief Complaint   Patient presents with   • Physical Exam     Preventative  • Follow-up     Routine overdue and review labs  History of Present Illness:     Adult Annual Physical   Patient here for a comprehensive physical exam  The patient reports no problems  Diet and Physical Activity  Diet/Nutrition: well balanced diet, limited junk food and consuming 3-5 servings of fruits/vegetables daily  Exercise: walking, moderate cardiovascular exercise and 5-7 times a week on average        Depression Screening  PHQ-2/9 Depression Screening    Little interest or pleasure in doing things: 0 - not at all  Feeling down, depressed, or hopeless: 0 - not at all  PHQ-2 Score: 0  PHQ-2 Interpretation: Negative depression screen       General Health  Sleep: sleeps well and gets 7-8 hours of sleep on average  Hearing: normal - bilateral   Vision: goes for regular eye exams and most recent eye exam <1 year ago  Dental: regular dental visits and brushes teeth twice daily  /GYN Health  Patient is: postmenopausal     Review of Systems:     Review of Systems   Constitutional: Positive for unexpected weight change (weight gain, chronic obesity issue)  Negative for activity change, fatigue and fever  HENT: Negative for congestion, hearing loss, rhinorrhea, trouble swallowing and voice change  Eyes: Negative for photophobia, pain, discharge and visual disturbance  Respiratory: Negative for cough, chest tightness and shortness of breath  Cardiovascular: Negative for chest pain, palpitations and leg swelling  Gastrointestinal: Negative for abdominal pain, blood in stool, constipation, nausea and vomiting  Endocrine: Negative for cold intolerance and heat intolerance  Genitourinary: Negative for difficulty urinating, frequency, hematuria, urgency, vaginal bleeding and vaginal discharge  Musculoskeletal: Negative for arthralgias and myalgias  Skin: Negative  Neurological: Negative for dizziness, weakness, numbness and headaches  Psychiatric/Behavioral: Negative for decreased concentration  The patient is not nervous/anxious         Past Medical History:     Past Medical History:   Diagnosis Date   • Ulnar neuritis, left       Past Surgical History:     Past Surgical History:   Procedure Laterality Date   • TONSILLECTOMY        Social History:     Social History     Socioeconomic History   • Marital status: /Civil Union     Spouse name: None   • Number of children: None   • Years of education: None   • Highest education level: None   Occupational History   • None   Tobacco Use   • Smoking status: Never Smoker   • Smokeless tobacco: Never Used   Substance and Sexual Activity   • Alcohol use: Yes   • Drug use: Never   • Sexual activity: None   Other Topics Concern   • None   Social History Narrative   • None     Social Determinants of Health     Financial Resource Strain: Not on file   Food Insecurity: Not on file   Transportation Needs: Not on file   Physical Activity: Not on file   Stress: Not on file   Social Connections: Not on file   Intimate Partner Violence: Not on file   Housing Stability: Not on file      Family History:     History reviewed  No pertinent family history  Current Medications:     Current Outpatient Medications   Medication Sig Dispense Refill   • Ranibizumab (LUCENTIS IO)      • Semaglutide-Weight Management (WEGOVY) 0 25 MG/0 5ML Inject 0 5 mL (0 25 mg total) under the skin once a week for 4 doses 2 mL 0     No current facility-administered medications for this visit  Allergies: Allergies   Allergen Reactions   • Latex       Physical Exam:     /88 (BP Location: Left arm, Patient Position: Sitting, Cuff Size: Large)   Pulse 84   Temp 97 6 °F (36 4 °C) (Tympanic)   Resp 20   Ht 5' 2 75" (1 594 m)   Wt 103 kg (226 lb 3 2 oz)   SpO2 96%   BMI 40 38 kg/m²     Physical Exam  Vitals and nursing note reviewed  Constitutional:       General: She is not in acute distress  Appearance: Normal appearance  She is obese  HENT:      Head: Normocephalic and atraumatic  Right Ear: Tympanic membrane, ear canal and external ear normal  There is no impacted cerumen  Left Ear: Tympanic membrane, ear canal and external ear normal  There is no impacted cerumen  Nose: Nose normal       Mouth/Throat:      Mouth: Mucous membranes are moist       Pharynx: Oropharynx is clear  No posterior oropharyngeal erythema  Eyes:      General:         Right eye: No discharge  Left eye: No discharge  Extraocular Movements: Extraocular movements intact  Pupils: Pupils are equal, round, and reactive to light     Cardiovascular:      Rate and Rhythm: Normal rate and regular rhythm  Pulses: Normal pulses  Heart sounds: Normal heart sounds  No murmur heard  Pulmonary:      Effort: Pulmonary effort is normal       Breath sounds: Normal breath sounds  Abdominal:      General: Bowel sounds are normal       Palpations: Abdomen is soft  Musculoskeletal:         General: Normal range of motion  Cervical back: Normal range of motion  Skin:     General: Skin is warm and dry  Capillary Refill: Capillary refill takes less than 2 seconds  Neurological:      General: No focal deficit present  Mental Status: She is alert and oriented to person, place, and time  Psychiatric:         Mood and Affect: Mood normal          Behavior: Behavior normal          Thought Content:  Thought content normal          Judgment: Judgment normal           Yoana Farah, [id number] [address]

## 2022-11-01 NOTE — PATIENT INSTRUCTIONS
Liraglutide (By injection)   Liraglutide ([id number])  Treats type 2 diabetes and helps with weight loss in certain patients  Also reduces the risk of heart attacks and strokes in patients with type 2 diabetes and heart or blood vessel disease  Brand Name(s): Saxenda Victoza   There may be other brand names for this medicine  When This Medicine Should Not Be Used: This medicine is not right for everyone  Do not use it if you had an allergic reaction to liraglutide, or if you have type 1 diabetes or multiple endocrine neoplasia syndrome type 2 (MEN 2) or if you or anyone in your family had medullary thyroid cancer  Tell your doctor if you are pregnant or have become pregnant while you are using this medicine  How to Use This Medicine:   Injectable  Your doctor will prescribe your exact dose and tell you how often it should be given  This medicine is given as a shot under your skin  It is usually given in your stomach, thighs, or upper arms  If you use insulin in addition to Victoza®, do not mix them into the same syringe  You may give the shots in the same area (including your stomach), but do not give the shots right next to each other  You may be taught how to give your medicine at home  Make sure you understand all instructions before giving yourself an injection  Do not use more medicine or use it more often than your doctor tells you to  Check the liquid in the pen  It should be clear and colorless  Do not use it if it is cloudy, discolored, or has particles in it  You will be shown the body areas where this shot can be given  Use a different body area each time you give yourself a shot  Keep track of where you give each shot to make sure you rotate body areas  Use a new needle and syringe each time you inject your medicine  Never share medicine pens with others under any circumstances  Sharing needles or pens can result in transmission of infection    Drink extra fluids so you will urinate more often and help prevent kidney problems  This medicine should come with a Medication Guide  Ask your pharmacist for a copy if you do not have one  Missed dose: If you miss a dose of this medicine, skip the missed dose and go back to your regular dosing schedule  Never take extra medicine to make up for a missed dose  If you miss a dose for 3 days or more, call your doctor to talk about how to restart your treatment  Store your new, unused medicine pen in its original carton in the refrigerator  Protect it from light  Do not freeze this medicine or use it if it has been frozen  You may store the opened medicine pen in the refrigerator or at room temperature for 30 days  Throw away any unused medicine after 30 days, even if it still has medicine in it  Always remove the needle from the pen before you store it  Throw away used needles in a hard, closed container that the needles cannot poke through  Keep this container away from children and pets  Drugs and Foods to Avoid:   Ask your doctor or pharmacist before using any other medicine, including over-the-counter medicines, vitamins, and herbal products  Do not use Saxenda® together with insulin  Some medicines can affect how [place]  works  Tell your doctor if you are using insulin or other diabetes medicine (including ? ?glibenclamide, glimepiride, glipizide) or any other oral medicine  Warnings While Using This Medicine:   Tell your doctor if you are breastfeeding, or if you have kidney disease, liver disease, digestion problems (including gastroparesis), gallbladder disease, or a history of pancreas problems, depression, or angioedema (swelling of the arms, face, hands, mouth, or throat)  Do not use Saxenda® if you are also using Victoza®  They contain the same medicine    This medicine may cause the following problems:   Increased risk for thyroid tumors  Pancreatitis (swelling of the pancreas)  Low blood sugar (when used together with insulin or other diabetes medicine)  Kidney problems  Gallbladder problems, including gallstones  Thoughts of hurting yourself Roro Sers)  Your doctor will do lab tests at regular visits to check on the effects of this medicine  Keep all appointments  Keep all medicine out of the reach of children  Never share your medicine with anyone  Possible Side Effects While Using This Medicine:   Call your doctor right away if you notice any of these side effects: Allergic reaction: Itching or hives, swelling in your face or hands, swelling or tingling in your mouth or throat, chest tightness, trouble breathing  Change in how much or how often you urinate, painful or burning urination  Feeling sad or depressed, thoughts of suicide, unusual changes in mood or behavior  Shaking, trembling, sweating, fast or pounding heartbeat, fainting, hunger, confusion  Sudden and severe stomach pain, nausea, vomiting, fever, lightheadedness, yellow skin or eyes  Trouble breathing or swallowing, a lump in your neck, hoarseness when speaking  If you notice these less serious side effects, talk with your doctor:   Decreased appetite  Diarrhea, constipation, stomach upset  Dizziness  Headache  Redness, itching, swelling, or any changes in your skin where the shot was given  If you notice other side effects that you think are caused by this medicine, tell your doctor  Call your doctor for medical advice about side effects  You may report side effects to FDA at 4-788-FDA-0515    © Copyright [employer] 2022 Information is for End User's use only and may not be sold, redistributed or otherwise used for commercial purposes  The above information is an  only  It is not intended as medical advice for individual conditions or treatments  Talk to your doctor, nurse or pharmacist before following any medical regimen to see if it is safe and effective for you        Wellness Visit for Adults   AMBULATORY CARE:   A wellness visit  is when you see your healthcare provider to get screened for health problems  Your healthcare provider will also give you advice on how to stay healthy  Write down your questions so you remember to ask them  Ask your healthcare provider how often you should have a wellness visit  What happens at a wellness visit:  Your healthcare provider will ask about your health, and your family history of health problems  This includes high blood pressure, heart disease, and cancer  He or she will ask if you have symptoms that concern you, if you smoke, and about your mood  You may also be asked about your intake of medicines, supplements, food, and alcohol  Any of the following may be done: Your weight  will be checked  Your height may also be checked so your body mass index (BMI) can be calculated  Your BMI shows if you are at a healthy weight  Your blood pressure  and heart rate will be checked  Your temperature may also be checked  Blood and urine tests  may be done  Blood tests may be done to check your cholesterol levels  Abnormal cholesterol levels increase your risk for heart disease and stroke  You may also need a blood or urine test to check for diabetes if you are at increased risk  Urine tests may be done to look for signs of an infection or kidney disease  A physical exam  includes checking your heartbeat and lungs with a stethoscope  Your healthcare provider may also check your skin to look for sun damage  Screening tests  may be recommended  A screening test is done to check for diseases that may not cause symptoms  The screening tests you may need depend on your age, gender, family history, and lifestyle habits  For example, colorectal screening may be recommended if you are 48years old or older  Screening tests you need if you are a woman:   A Pap smear  is used to screen for cervical cancer  Pap smears are usually done every 3 to 5 years depending on your age   You may need them more often if you have had abnormal Pap smear test results in the past  Ask your healthcare provider how often you should have a Pap smear  A mammogram  is an x-ray of your breasts to screen for breast cancer  Experts recommend mammograms every 2 years starting at age 48 years  You may need a mammogram at age 52 years or younger if you have an increased risk for breast cancer  Talk to your healthcare provider about when you should start having mammograms and how often you need them  Vaccines you may need:   Get an influenza vaccine  every year  The influenza vaccine protects you from the flu  Several types of viruses cause the flu  The viruses change over time, so new vaccines are made each year  Get a tetanus-diphtheria (Td) booster vaccine  every 10 years  This vaccine protects you against tetanus and diphtheria  Tetanus is a severe infection that may cause painful muscle spasms and lockjaw  Diphtheria is a severe bacterial infection that causes a thick covering in the back of your mouth and throat  Get a human papillomavirus (HPV) vaccine  if you are female and aged 23 to 32 or male 23 to 24 and never received it  This vaccine protects you from HPV infection  HPV is the most common infection spread by sexual contact  HPV may also cause vaginal, penile, and anal cancers  Get a pneumococcal vaccine  if you are aged 72 years or older  The pneumococcal vaccine is an injection given to protect you from pneumococcal disease  Pneumococcal disease is an infection caused by pneumococcal bacteria  The infection may cause pneumonia, meningitis, or an ear infection  Get a shingles vaccine  if you are 60 or older, even if you have had shingles before  The shingles vaccine is an injection to protect you from the varicella-zoster virus  This is the same virus that causes chickenpox  Shingles is a painful rash that develops in people who had chickenpox or have been exposed to the virus      How to eat healthy:  My Plate is a model for planning healthy meals  It shows the types and amounts of foods that should go on your plate  Fruits and vegetables make up about half of your plate, and grains and protein make up the other half  A serving of dairy is included on the side of your plate  The amount of calories and serving sizes you need depends on your age, gender, weight, and height  Examples of healthy foods are listed below:  Eat a variety of vegetables  such as dark green, red, and orange vegetables  You can also include canned vegetables low in sodium (salt) and frozen vegetables without added butter or sauces  Eat a variety of fresh fruits , canned fruit in 100% juice, frozen fruit, and dried fruit  Include whole grains  At least half of the grains you eat should be whole grains  Examples include whole-wheat bread, wheat pasta, brown rice, and whole-grain cereals such as oatmeal     Eat a variety of protein foods such as seafood (fish and shellfish), lean meat, and poultry without skin (turkey and chicken)  Examples of lean meats include pork leg, shoulder, or tenderloin, and beef round, sirloin, tenderloin, and extra lean ground beef  Other protein foods include eggs and egg substitutes, beans, peas, soy products, nuts, and seeds  Choose low-fat dairy products such as skim or 1% milk or low-fat yogurt, cheese, and cottage cheese  Limit unhealthy fats  such as butter, hard margarine, and shortening  Exercise:  Exercise at least 30 minutes per day on most days of the week  Some examples of exercise include walking, biking, dancing, and swimming  You can also fit in more physical activity by taking the stairs instead of the elevator or parking farther away from stores  Include muscle strengthening activities 2 days each week  Regular exercise provides many health benefits  It helps you manage your weight, and decreases your risk for type 2 diabetes, heart disease, stroke, and high blood pressure   Exercise can also help improve your mood  Ask your healthcare provider about the best exercise plan for you  General health and safety guidelines:   Do not smoke  Nicotine and other chemicals in cigarettes and cigars can cause lung damage  Ask your healthcare provider for information if you currently smoke and need help to quit  E-cigarettes or smokeless tobacco still contain nicotine  Talk to your healthcare provider before you use these products  Limit alcohol  A drink of alcohol is 12 ounces of beer, 5 ounces of wine, or 1½ ounces of liquor  Lose weight, if needed  Being overweight increases your risk of certain health conditions  These include heart disease, high blood pressure, type 2 diabetes, and certain types of cancer  Protect your skin  Do not sunbathe or use tanning beds  Use sunscreen with a SPF 15 or higher  Apply sunscreen at least 15 minutes before you go outside  Reapply sunscreen every 2 hours  Wear protective clothing, hats, and sunglasses when you are outside  Drive safely  Always wear your seatbelt  Make sure everyone in your car wears a seatbelt  A seatbelt can save your life if you are in an accident  Do not use your cell phone when you are driving  This could distract you and cause an accident  Pull over if you need to make a call or send a text message  Practice safe sex  Use latex condoms if are sexually active and have more than one partner  Your healthcare provider may recommend screening tests for sexually transmitted infections (STIs)  Wear helmets, lifejackets, and protective gear  Always wear a helmet when you ride a bike or motorcycle, go skiing, or play sports that could cause a head injury  Wear protective equipment when you play sports  Wear a lifejacket when you are on a boat or doing water sports  © Copyright [employer] 2022 Information is for End User's use only and may not be sold, redistributed or otherwise used for commercial purposes   All illustrations and images included in [employer] are the copyrighted property of A D A M , Inc  or [facility] Vivian Bledsoe   The above information is an  only  It is not intended as medical advice for individual conditions or treatments  Talk to your doctor, nurse or pharmacist before following any medical regimen to see if it is safe and effective for you  Obesity   AMBULATORY CARE:   Obesity  means your body mass index (BMI) is greater than 30  Your healthcare provider will use your height and weight to measure your BMI  The risks of obesity include  many health problems, including injuries or physical disability  Diabetes (high blood sugar level)    High blood pressure or high cholesterol    Heart disease    Stroke    Gallbladder or liver disease    Cancer of the colon, breast, prostate, liver, or kidney    Sleep apnea    Arthritis or gout    Screening  is done to check for health conditions before you have signs or symptoms  If you are 28to 79years old, your blood sugar level may be checked every 3 years for signs of prediabetes or diabetes  Your healthcare provider will check your blood pressure at each visit  High blood pressure can lead to a stroke or other problems  Your provider may check for signs of heart disease, cancer, or other health problems  Seek care immediately if:   You have a severe headache, confusion, or difficulty speaking  You have weakness on one side of your body  You have chest pain, sweating, or shortness of breath  Call your doctor if:   You have symptoms of gallbladder or liver disease, such as pain in your upper abdomen  You have knee or hip pain and discomfort while walking  You have symptoms of diabetes, such as intense hunger and thirst, and frequent urination  You have symptoms of sleep apnea, such as snoring or daytime sleepiness  You have questions or concerns about your condition or care      Treatment for obesity  focuses on helping you lose weight to improve your health  Even a small decrease in BMI can reduce the risk for many health problems  Your healthcare provider will help you set a weight-loss goal   Lifestyle changes  are the first step in treating obesity  These include making healthy food choices and getting regular physical activity  Your healthcare provider may suggest a weight-loss program that involves coaching, education, and therapy  Medicine  may help you lose weight when it is used with a healthy foods and physical activity  Surgery  can help you lose weight if you are very obese and have other health problems  There are several types of weight-loss surgery  Ask your healthcare provider for more information  Tips for safe weight loss:   Set small, realistic goals  An example of a small goal is to walk for 20 minutes 5 days a week  Anther goal is to lose 5% of your body weight  Tell friends, family members, and coworkers about your goals  and ask for their support  Ask a friend to lose weight with you, or join a weight-loss support group  Identify foods or triggers that may cause you to overeat , and find ways to avoid them  Remove tempting high-calorie foods from your home and workplace  Place a bowl of fresh fruit on your kitchen counter  If stress causes you to eat, then find other ways to cope with stress  A counselor or therapist may be able to help you  Keep a diary to track what you eat and drink  Also write down how many minutes of physical activity you do each day  Weigh yourself once a week and record it in your diary  Eating changes: You will need to eat 500 to 1,000 fewer calories each day than you currently eat to lose 1 to 2 pounds a week  The following changes will help you cut calories:  Eat smaller portions  Use small plates, no larger than 9 inches in diameter  Fill your plate half full of fruits and vegetables  Measure your food using measuring cups until you know what a serving size looks like           Eat 3 meals and 1 or 2 snacks each day  Plan your meals in advance  [place] and eat at home most of the time  Eat slowly  Do not skip meals  Skipping meals can lead to overeating later in the day  This can make it harder for you to lose weight  Talk with a dietitian to help you make a meal plan and schedule that is right for you  Eat fruits and vegetables at every meal   They are low in calories and high in fiber, which makes you feel full  Do not add butter, margarine, or cream sauce to vegetables  Use herbs to season steamed vegetables  Eat less fat and fewer fried foods  Eat more baked or grilled chicken and fish  These protein sources are lower in calories and fat than red meat  Limit fast food  Dress your salads with olive oil and vinegar instead of bottled dressing  Limit the amount of sugar you eat  Do not drink sugary beverages  Limit alcohol  Activity changes:  Physical activity is good for your body in many ways  It helps you burn calories and build strong muscles  It decreases stress and depression, and improves your mood  It can also help you sleep better  Talk to your healthcare provider before you begin an exercise program   Exercise for at least 30 minutes 5 days a week  Start slowly  Set aside time each day for physical activity that you enjoy and that is convenient for you  It is best to do both weight training and an activity that increases your heart rate, such as walking, bicycling, or swimming  Find ways to be more active  Do yard work and housecleaning  Walk up the stairs instead of using elevators  Spend your leisure time going to events that require walking, such as outdoor festivals or fairs  This extra physical activity can help you lose weight and keep it off  Follow up with your doctor as directed: You may need to meet with a dietitian  Write down your questions so you remember to ask them during your visits    © Copyright [employer] 2022 Information is for End User's use only and may not be sold, redistributed or otherwise used for commercial purposes  All illustrations and images included in CareNotes® are the copyrighted property of A D A M , Inc  or Trina Levine  The above information is an  only  It is not intended as medical advice for individual conditions or treatments  Talk to your doctor, nurse or pharmacist before following any medical regimen to see if it is safe and effective for you  Cholesterol and Your Health   AMBULATORY CARE:   Cholesterol  is a waxy, fat-like substance  Your body uses cholesterol to make hormones and new cells, and to protect nerves  Cholesterol is made by your body  It also comes from certain foods you eat, such as meat and dairy products  Your healthcare provider can help you set goals for your cholesterol levels  He or she can help you create a plan to meet your goals  Cholesterol level goals: Your cholesterol level goals depend on your risk for heart disease, your age, and your other health conditions  The following are general guidelines: Total cholesterol  includes low-density lipoprotein (LDL), high-density lipoprotein (HDL), and triglyceride levels  The total cholesterol level should be lower than 200 mg/dL and is best at about 150 mg/dL  LDL cholesterol  is called bad cholesterol  because it forms plaque in your arteries  As plaque builds up, your arteries become narrow, and less blood flows through  When plaque decreases blood flow to your heart, you may have chest pain  If plaque completely blocks an artery that brings blood to your heart, you may have a heart attack  Plaque can break off and form blood clots  Blood clots may block arteries in your brain and cause a stroke  The level should be less than 130 mg/dL and is best at about 100 mg/dL  HDL cholesterol  is called good cholesterol  because it helps remove LDL cholesterol from your arteries   It does this by attaching to LDL cholesterol and carrying it to your liver  Your liver breaks down LDL cholesterol so your body can get rid of it  High levels of HDL cholesterol can help prevent a heart attack and stroke  Low levels of HDL cholesterol can increase your risk for heart disease, heart attack, and stroke  The level should be 60 mg/dL or higher  Triglycerides  are a type of fat that store energy from foods you eat  High levels of triglycerides also cause plaque buildup  This can increase your risk for a heart attack or stroke  If your triglyceride level is high, your LDL cholesterol level may also be high  The level should be less than 150 mg/dL  Any of the following can increase your risk for high cholesterol:   Smoking cigarettes    Being overweight or obese, or not getting enough exercise    Drinking large amounts of alcohol    A medical condition such as hypertension (high blood pressure) or diabetes    Certain genes passed from your parents to you    Age older than 65 years    What you need to know about having your cholesterol levels checked: Adults 21to 39years of age should have their cholesterol levels checked every 4 to 6 years  Adults 45 years or older should have their cholesterol checked every 1 to 2 years  You may need your cholesterol checked more often, or at a younger age, if you have risk factors for heart disease  You may also need to have your cholesterol checked more often if you have other health conditions, such as diabetes  Blood tests are used to check cholesterol levels  Blood tests measure your levels of triglycerides, LDL cholesterol, and HDL cholesterol  How healthy fats affect your cholesterol levels:  Healthy fats, also called unsaturated fats, help lower LDL cholesterol and triglyceride levels  Healthy fats include the following:  Monounsaturated fats  are found in foods such as olive oil, canola oil, avocado, nuts, and olives      Polyunsaturated fats,  such as omega 3 fats, are found in fish, such as salmon, trout, and tuna  They can also be found in plant foods such as flaxseed, walnuts, and soybeans  How unhealthy fats affect your cholesterol levels:  Unhealthy fats increase LDL cholesterol and triglyceride levels  They are found in foods high in cholesterol, saturated fat, and trans fat:  Cholesterol  is found in eggs, dairy, and meat  Saturated fat  is found in butter, cheese, ice cream, whole milk, and coconut oil  Saturated fat is also found in meat, such as sausage, hot dogs, and bologna  Trans fat  is found in liquid oils and is used in fried and baked foods  Foods that contain trans fats include chips, crackers, muffins, sweet rolls, microwave popcorn, and cookies  Treatment  for high cholesterol will also decrease your risk of heart disease, heart attack, and stroke  Treatment may include any of the following:  Lifestyle changes  may include food, exercise, weight loss, and quitting smoking  You may also need to decrease the amount of alcohol you drink  Your healthcare provider will want you to start with lifestyle changes  Other treatment may be added if lifestyle changes are not enough  Your healthcare provider may recommend you work with a team to manage hyperlipidemia  The team may include medical experts such as a dietitian, an exercise or physical therapist, and a behavior therapist  Your family members may be included in helping you create lifestyle changes  Medicines  may be given to lower your LDL cholesterol, triglyceride levels, or total cholesterol level  You may need medicines to lower your cholesterol if any of the following is true:    You have a history of stroke, TIA, unstable angina, or a heart attack  Your LDL cholesterol level is 190 mg/dL or higher  You are age 36 to 76 years, have diabetes or heart disease risk factors, and your LDL cholesterol is 70 mg/dL or higher  Supplements  include fish oil, red yeast rice, and garlic   Fish oil may help lower your triglyceride and LDL cholesterol levels  It may also increase your HDL cholesterol level  Red yeast rice may help decrease your total cholesterol level and LDL cholesterol level  Garlic may help lower your total cholesterol level  Do not take any supplements without talking to your healthcare provider  Food changes you can make to lower your cholesterol levels:  A dietitian can help you create a healthy eating plan  He or she can show you how to read food labels and choose foods low in saturated fat, trans fats, and cholesterol  Decrease the total amount of fat you eat  Choose lean meats, fat-free or 1% fat milk, and low-fat dairy products, such as yogurt and cheese  Try to limit or avoid red meats  Limit or do not eat fried foods or baked goods, such as cookies  Replace unhealthy fats with healthy fats  Cook foods in olive oil or canola oil  Choose soft margarines that are low in saturated fat and trans fat  Seeds, nuts, and avocados are other examples of healthy fats  Eat foods with omega-3 fats  Examples include salmon, tuna, mackerel, walnuts, and flaxseed  Eat fish 2 times per week  Pregnant women should not eat fish that have high levels of mercury, such as shark, swordfish, and marlo mackerel  Increase the amount of high-fiber foods you eat  High-fiber foods can help lower your LDL cholesterol  Aim to get between 20 and 30 grams of fiber each day  Fruits and vegetables are high in fiber  Eat at least 5 servings each day  Other high-fiber foods are whole-grain or whole-wheat breads, pastas, or cereals, and brown rice  Eat 3 ounces of whole-grain foods each day  Increase fiber slowly  You may have abdominal discomfort, bloating, and gas if you add fiber to your diet too quickly  Eat healthy protein foods  Examples include low-fat dairy products, skinless chicken and turkey, fish, and nuts  Limit foods and drinks that are high in sugar    Your dietitian or healthcare provider can help you create daily limits for high-sugar foods and drinks  The limit may be lower if you have diabetes or another health condition  Limits can also help you lose weight if needed  Lifestyle changes you can make to lower your cholesterol levels:   Maintain a healthy weight  Ask your healthcare provider what a healthy weight is for you  Ask him or her to help you create a weight loss plan if needed  Weight loss can decrease your total cholesterol and triglyceride levels  Weight loss may also help keep your blood pressure at a healthy level  Be physically active throughout the day  Physical activity, such as exercise, can help lower your total cholesterol level and maintain a healthy weight  Physical activity can also help increase your HDL cholesterol level  Work with your healthcare provider to create an program that is right for you  Get at least 30 to 40 minutes of moderate physical activity most days of the week  Examples of exercise include brisk walking, swimming, or biking  Also include strength training at least 2 times each week  Your healthcare providers can help you create a physical activity plan  Do not smoke  Nicotine and other chemicals in cigarettes and cigars can raise your cholesterol levels  Ask your healthcare provider for information if you currently smoke and need help to quit  E-cigarettes or smokeless tobacco still contain nicotine  Talk to your healthcare provider before you use these products  Limit or do not drink alcohol  Alcohol can increase your triglyceride levels  Ask your healthcare provider before you drink alcohol  Ask how much is okay for you to drink in 24 hours or 1 week  Follow up with your doctor as directed:  Write down your questions so you remember to ask them during your visits  © [employer] 2022 Information is for End User's use only and may not be sold, redistributed or otherwise used for commercial purposes  All illustrations and images included in CareNotes® are the copyrighted property of A D A M [employer] Inc  or Trina Levine  The above information is an  only  It is not intended as medical advice for individual conditions or treatments  Talk to your doctor, nurse or pharmacist before following any medical regimen to see if it is safe and effective for you

## 2022-11-01 NOTE — TELEPHONE ENCOUNTER
Max Anderson from [facility] stated that Alexia Serum is on back order and she stated that patient's insurance wouldn't cover it, she stated that you could send in ozempic for   25 or   50 could be sent in but a prior authorization would need to be done

## 2022-11-01 NOTE — TELEPHONE ENCOUNTER
Violet with [facility] ([phone]) called in regards to script for linraglutide [facility]) injection  States there is a discrepancy with dose (states dose should taper up)  Also reports that medication is not covered by patient's insurance and would be very expensive  Asks if alternative medication can be sent

## 2022-11-02 ENCOUNTER — TELEPHONE (OUTPATIENT)
Dept: INTERNAL MEDICINE CLINIC | Facility: CLINIC | Age: 72
End: 2022-11-02

## 2022-11-02 NOTE — TELEPHONE ENCOUNTER
PA has been initiated for the patients Saxenda 18MG/3ML pen-injectors, patients key is: [id number]  If any issues will contact [address] [place] [address],[address] [place] [phone]

## 2023-01-24 DIAGNOSIS — E66.01 CLASS 3 SEVERE OBESITY DUE TO EXCESS CALORIES WITHOUT SERIOUS COMORBIDITY WITH BODY MASS INDEX (BMI) OF 40.0 TO 44.9 IN ADULT (HCC): ICD-10-CM

## 2023-02-22 ENCOUNTER — OFFICE VISIT (OUTPATIENT)
Dept: FAMILY MEDICINE CLINIC | Facility: CLINIC | Age: 73
End: 2023-02-22

## 2023-02-22 ENCOUNTER — TELEPHONE (OUTPATIENT)
Dept: FAMILY MEDICINE CLINIC | Facility: CLINIC | Age: 73
End: 2023-02-22

## 2023-02-22 VITALS
HEART RATE: 80 BPM | DIASTOLIC BLOOD PRESSURE: 92 MMHG | TEMPERATURE: 97.4 F | RESPIRATION RATE: 16 BRPM | HEIGHT: 63 IN | BODY MASS INDEX: 38.45 KG/M2 | WEIGHT: 217 LBS | SYSTOLIC BLOOD PRESSURE: 124 MMHG

## 2023-02-22 DIAGNOSIS — E66.01 CLASS 3 SEVERE OBESITY DUE TO EXCESS CALORIES WITHOUT SERIOUS COMORBIDITY WITH BODY MASS INDEX (BMI) OF 40.0 TO 44.9 IN ADULT (HCC): ICD-10-CM

## 2023-02-22 DIAGNOSIS — Z23 ENCOUNTER FOR IMMUNIZATION: Primary | ICD-10-CM

## 2023-02-22 NOTE — ASSESSMENT & PLAN NOTE
The patient started on Wegovy in November of last year for her obesity  So far she has lost nine pounds  She states she has not been watching her dietary intake  She just started exercising  She is tolerating the medication without side effects  I will increase her dosage to 1 mg weekly  She was advised that this dose can be increased in four weeks to 1 7 mg and then in four weeks to 2 4 mg if she is tolerating the medication   She has been encouraged to watch her calorie intake and to exercise 3-5 times weekly

## 2023-02-22 NOTE — PATIENT INSTRUCTIONS
May increase Wegovy every four weeks until at max dose 2 4    Benefits of an Active Lifestyle   AMBULATORY CARE:   An active lifestyle  means you do physical activity throughout the day  Any activity that gets you up and moving is part of an active lifestyle  Physical activity includes exercise such as walking or lifting weights  It also includes playing sports  Physical activity is different from other kinds of activity, such as reading a book  This kind of activity is called sedentary  A sedentary lifestyle means you sit or do not move much during the day  An active lifestyle has many benefits, such as helping you prevent or manage health conditions  Call your doctor if:   You notice changes in your health, such as new or worsening shortness of breath  You have questions or concerns about your condition or care  Benefits of an active lifestyle: You may be able to do daily activities more easily  Activity helps condition your heart, lungs, and muscles  This can help you get through your daily activities without feeling tired  You can help control your weight  Activity helps your body use the calories you eat instead of storing them as fat  Your body continues to burn calories at a higher rate after you are active  Activity can increase your health  Activity helps lower your risk for cancer, heart disease, diabetes, and stroke  Activity can help you control your blood pressure and blood sugar levels, and lower your cholesterol  If you have arthritis, activity can help your joints move more easily and with less pain  Your bones and muscles will get stronger  This will help prevent osteoporosis and reduce your risk for falls  Activity can help improve your mood  Activity can reduce or prevent depression and stress  Activity can also help improve your sleep      Risks of a sedentary lifestyle:  A sedentary lifestyle increases your risk for diseases such as diabetes, high blood pressure, and heart disease  Your immune system also becomes weaker  This means it cannot fight infections well  How much activity you need:  Any activity is better than no activity at all  When you go from being mostly inactive to adding some activity, you will see health benefits  The following are general guidelines:  Do aerobic activity several days each week  Aerobic activity includes walking, bicycling, dancing, swimming, and raking leaves  Aim for 150 to 300 minutes of moderate activity, or 75 to 150 of vigorous activity each week  You can also do a combination of moderate and vigorous activity  Do strength training at least 2 times each week  Strength training helps you keep the muscles you have and build new muscles  Strength training includes pushups, yoga, fabi chi, and weightlifting  If you do not have access to weights, you can lift items around your house  Try to work all the major muscle groups, such as your legs, arms, abdomen, and chest  Do 2 or 3 sets on each area  Use a weight that is slightly heavier than you can lift easily  You can work up to [employer]  You can also use resistance bands instead of weights  Steps you can take to become more active:   Set goals  Set some long-term goals and some short-term goals  For example, you may want to be able to walk for 30 minutes without becoming short of breath  Try not to put time requirements on your goals  For example, do not think you should reach your goal in a month  Set smaller goals, such as walking a little longer each week, or feeling less shortness of breath  Be active all day  Activity does not have to mean structured exercise each day  You can be more active by making small changes all day  For example, try parking as far from the entrance of buildings as you can when you run errands  If possible, walk or ride a bike instead of driving  Take the stairs instead of the elevator           Keep a record of your activity and your progress  You can do this by writing down your daily activity  Include the kind of activity and how long you did it  You can also use a program on your phone or other device that will track activity for you  Also record your progress  You may be doing daily activities more easily, sleeping better, or building muscles  Step counting can help you monitor activity  A general guide is to take 10,000 steps each day  A pedometer is a device you can wear to track your steps  Some phones have programs that will count and record steps  You may need to work up to 10,000 steps  Start by finding out how many steps you usually take in a day  Then try to take more steps each day than you took the day before  Tips to help you stay on track:   Start slowly and work up  You do not have to do 30 minutes of activity at one time  You can break the activity up and do a few minutes at a time  Remember that some physical activity is better than none  Stand up during the day, even if you cannot walk around  Your body uses more energy when you stand  You may be able to get a desk that allows you to stand while you type or make phone calls for work  Aim for a speed or intensity that is challenging but not too difficult  You should be able to speak a few words at a time but not be able to sing  Plan activities you enjoy  Do a variety of activities so you do not become bored and you stay challenged  Include activities that strengthen your bones  These activities are called weight-bearing exercises  Examples include tennis, jumping rope, and running  Swimming, riding a bike, and similar exercises keep weight off your bones  They will not help strengthen bones, but they will help your heart and lungs work better  Ask for support from the people in your life  Go for a walk after dinner with your family  Meet friends at the park  Take a break with a coworker and walk around   Find someone who likes to go to the gym at the same time you do  You may be more likely to go if you know another person is counting on you  Get involved in community events, such as cleaning a community park  Ask someone to help you stay on track  For example, you can tell the person about your daily or weekly activity  Treat yourself to a reward when you reach a goal   The rewards can be for activity done for a certain amount of time each day or days each week  Rewards can also be for progress you make  Have rewards that are not food, such as a new clothing item or book  What you need to know about nutrition and activity:  Healthy foods will give you the energy you need to be active  Activity and good nutrition work together to help you reach or maintain a healthy weight  Healthy foods include fruits, vegetables, lean meats, fish, cooked beans, whole-grain breads, and low-fat dairy products  Your healthcare provider can help you create a healthy meal plan  He or she can tell you how many calories you need to stay active and still lose weight if needed  Follow up with your doctor as directed:  Write down your questions so you remember to ask them during your visits  © Copyright [facility] 2022 Information is for End User's use only and may not be sold, redistributed or otherwise used for commercial purposes  The above information is an  only  It is not intended as medical advice for individual conditions or treatments  Talk to your doctor, nurse or pharmacist before following any medical regimen to see if it is safe and effective for you

## 2023-02-22 NOTE — PROGRESS NOTES
[facility]    NAME: Christiana Izaguirre  AGE: 81 y o  SEX: female  : 1950     DATE: 2023     Assessment and Plan:     Problem List Items Addressed This Visit        Other    Class 3 severe obesity due to excess calories without serious comorbidity with body mass index (BMI) of 40 0 to 44 9 in [facility])     The patient started on Wegovy in November of last year for her obesity  So far she has lost nine pounds  She states she has not been watching her dietary intake  She just started exercising  She is tolerating the medication without side effects  I will increase her dosage to 1 mg weekly  She was advised that this dose can be increased in four weeks to 1 7 mg and then in four weeks to 2 4 mg if she is tolerating the medication  She has been encouraged to watch her calorie intake and to exercise 3-5 times weekly         Relevant Medications    Semaglutide-Weight Management (Kalpana Garcia) 1 MG/0 5ML   Other Visit Diagnoses     Encounter for immunization    -  Primary          BMI Counseling: Body mass index is 38 75 kg/m²  The BMI is above normal  Nutrition recommendations include decreasing portion sizes, moderation in carbohydrate intake, increasing intake of lean protein, reducing intake of saturated and trans fat and reducing intake of cholesterol  Exercise recommendations include moderate physical activity 150 minutes/week and exercising 3-5 times per week  Pharmacotherapy was ordered to help aid in weight loss  Rationale for BMI follow-up plan is due to patient being overweight or obese  Continue wegovy, will increase dose    Depression Screening and Follow-up Plan: Patient was screened for depression during today's encounter  They screened negative with a PHQ-2 score of 0  No follow-ups on file  Chief Complaint:     Chief Complaint   Patient presents with   • Obesity     Weight Management          History of Present Illness:   Mary Labs remains on Wegovy for weight loss  She currently is on 0 5 mg weekly  She is tolerating the medication  She has been walking on the treadmill 3-4 times weekly  She is making healthier food choices  I will increase her dose to 1 mg weekly  She will contact me in 4 weeks via My Chart to let me know her weight and if she is tolerating the current dose increase  If she is, I will increase to 1 7 mg weekly at that time  Review of Systems:     Review of Systems   Constitutional: Negative for activity change, fatigue and fever  HENT: Negative for congestion, hearing loss, rhinorrhea, trouble swallowing and voice change  Eyes: Negative for photophobia, pain, discharge and visual disturbance  Respiratory: Negative for cough, chest tightness and shortness of breath  Cardiovascular: Negative for chest pain, palpitations and leg swelling  Gastrointestinal: Positive for constipation  Negative for abdominal pain, blood in stool, nausea and vomiting  Endocrine: Negative for cold intolerance and heat intolerance  Genitourinary: Negative for difficulty urinating, frequency, hematuria, urgency, vaginal bleeding and vaginal discharge  Musculoskeletal: Negative for arthralgias and myalgias  Skin: Negative  Neurological: Negative for dizziness, weakness, numbness and headaches  Psychiatric/Behavioral: Negative for decreased concentration  The patient is not nervous/anxious           Problem List:     Patient Active Problem List   Diagnosis   • Cubital tunnel syndrome on left   • Cataract of both eyes   • Class 3 severe obesity due to excess calories without serious comorbidity with body mass index (BMI) of 40 0 to 44 9 in adult (HCC)        Objective:     /92 (BP Location: Left arm, Patient Position: Sitting, Cuff Size: Large)   Pulse 80   Temp (!) 97 4 °F (36 3 °C) (Tympanic)   Resp 16   Ht 5' 2 75" (1 594 m)   Wt 98 4 kg (217 lb)   BMI 38 75 kg/m²     Current Outpatient Medications Medication Sig Dispense Refill   • Ranibizumab (LUCENTIS IO)      • Semaglutide-Weight Management (WEGOVY) 1 MG/0 5ML Inject 0 5 mL (1 mg total) under the skin once a week for 28 days 0 5 mL 3     No current facility-administered medications for this visit  Physical Exam  Vitals reviewed  Constitutional:       Appearance: Normal appearance  She is obese  HENT:      Head: Normocephalic  Nose: Nose normal       Mouth/Throat:      Mouth: Mucous membranes are moist       Pharynx: Oropharynx is clear  Eyes:      Extraocular Movements: Extraocular movements intact  Pupils: Pupils are equal, round, and reactive to light  Cardiovascular:      Rate and Rhythm: Normal rate and regular rhythm  Pulses: Normal pulses  Heart sounds: Normal heart sounds  Pulmonary:      Effort: Pulmonary effort is normal       Breath sounds: Normal breath sounds  Musculoskeletal:         General: Normal range of motion  Skin:     General: Skin is warm and dry  Neurological:      General: No focal deficit present  Mental Status: She is alert and oriented to person, place, and time  Psychiatric:         Mood and Affect: Mood normal          Behavior: Behavior normal          Thought Content:  Thought content normal          Judgment: Judgment normal          Brian Snow, [id number]

## 2023-02-22 NOTE — TELEPHONE ENCOUNTER
Received call from [facility] with [facility] ([phone]) called to report prior auth is needed for Semaglutide-Weight Management (Siacarolina Weinsteinus) 1mg/0 5ml

## 2023-03-13 DIAGNOSIS — E66.01 CLASS 3 SEVERE OBESITY DUE TO EXCESS CALORIES WITHOUT SERIOUS COMORBIDITY WITH BODY MASS INDEX (BMI) OF 40.0 TO 44.9 IN ADULT (HCC): ICD-10-CM

## 2023-03-24 ENCOUNTER — TELEPHONE (OUTPATIENT)
Dept: FAMILY MEDICINE CLINIC | Facility: CLINIC | Age: 73
End: 2023-03-24

## 2023-03-24 NOTE — TELEPHONE ENCOUNTER
----- Message from Rachel Izaguirre sent at 3/20/2023  1:41 PM EDT -----  Regarding: Prior authorization  Contact: [phone]  Franky Nava,    When I went to  [facility] pharmacy said that [facility] needs prior authorization  # to call is       Thanks,    [employer]

## 2023-04-27 ENCOUNTER — RA CDI HCC (OUTPATIENT)
Dept: OTHER | Facility: HOSPITAL | Age: 73
End: 2023-04-27

## 2023-04-27 DIAGNOSIS — E66.01 CLASS 3 SEVERE OBESITY DUE TO EXCESS CALORIES WITHOUT SERIOUS COMORBIDITY WITH BODY MASS INDEX (BMI) OF 40.0 TO 44.9 IN ADULT (HCC): ICD-10-CM

## 2023-04-27 NOTE — PROGRESS NOTES
[facility]  coding opportunities       Chart reviewed, no opportunity found: CHART REVIEWED, NO OPPORTUNITY FOUND        Patients Insurance        Commercial Insurance: [address] [place] [address]

## 2023-05-01 NOTE — TELEPHONE ENCOUNTER
Called patient to follow up on how she is doing with [facility]  Requested patient to call back

## 2023-05-03 ENCOUNTER — HOSPITAL ENCOUNTER (OUTPATIENT)
Dept: RADIOLOGY | Age: 73
Discharge: HOME | End: 2023-05-03

## 2023-05-03 VITALS — BODY MASS INDEX: 38.45 KG/M2 | HEIGHT: 63 IN | WEIGHT: 217 LBS

## 2023-05-03 VITALS — WEIGHT: 213 LBS | HEIGHT: 62 IN | BODY MASS INDEX: 39.2 KG/M2

## 2023-05-03 DIAGNOSIS — Z78.0 POST-MENOPAUSAL: ICD-10-CM

## 2023-05-03 DIAGNOSIS — Z12.31 SCREENING MAMMOGRAM FOR HIGH-RISK PATIENT: ICD-10-CM

## 2023-05-04 ENCOUNTER — OFFICE VISIT (OUTPATIENT)
Dept: FAMILY MEDICINE CLINIC | Facility: CLINIC | Age: 73
End: 2023-05-04

## 2023-05-04 VITALS
SYSTOLIC BLOOD PRESSURE: 130 MMHG | TEMPERATURE: 97.1 F | WEIGHT: 213 LBS | BODY MASS INDEX: 39.2 KG/M2 | DIASTOLIC BLOOD PRESSURE: 80 MMHG | HEIGHT: 62 IN | RESPIRATION RATE: 16 BRPM | OXYGEN SATURATION: 99 % | HEART RATE: 77 BPM

## 2023-05-04 DIAGNOSIS — E66.01 CLASS 3 SEVERE OBESITY DUE TO EXCESS CALORIES WITHOUT SERIOUS COMORBIDITY WITH BODY MASS INDEX (BMI) OF 40.0 TO 44.9 IN ADULT (HCC): Primary | ICD-10-CM

## 2023-05-04 NOTE — ASSESSMENT & PLAN NOTE
Pt has some nausea on Wegovy 1mg weekly and prefers to remain on this dosage for now  She is averaging approximately 1 pound weight loss per week   She is exercising regularly and watching her diet  Trying to stay away from soda   Wegovy 1 mg weekly refilled today, SE reiterated   Plan to hold off on increasing this to 1 7mg weekly for now  Encouraged regular exercise and dietary modifications

## 2023-05-04 NOTE — PROGRESS NOTES
Name: Joanne Alfaro      : 1950      MRN: [id number]  Encounter Provider: MAXIM Rowan  Encounter Date: 2023   Encounter department: [address] [place] [address]     DEXA scan and Mammogram performed yesterday, results still pending    1  Class 3 severe obesity due to excess calories without serious comorbidity with body mass index (BMI) of 40 0 to 44 9 in adult [facility])  Assessment & Plan:  Pt has some nausea on Wegovy 1mg weekly and prefers to remain on this dosage for now  She is averaging approximately 1 pound weight loss per week   She is exercising regularly and watching her diet  Trying to stay away from soda   Wegovy 1 mg weekly refilled today, SE reiterated   Plan to hold off on increasing this to 1 7mg weekly for now  Encouraged regular exercise and dietary modifications     Orders:  -     Semaglutide-Weight Management (WEGOVY) 1 MG/0 5ML; Inject 0 5 mL (1 mg total) under the skin once a week         Subjective     HPI     Pt presents by herself today for a routine follow up  Doing well overall    She was started on Wegovy back in 2022 for weight loss  BMI today at 38 96  She started out on Wegovy 0 5mg weekly and then increased then to 1mg weekly  She does have some nausea with this and doesn't feel she would tolerate the increased dosage of 1 7mg weekly  She would prefer to stay on the 1mg weekly for now  She has been averaging about a one pound weight loss per week on this med which she is happy with  She is also trying to exercise and be mindful of her diet    She had a DEXA scan and Mammogram completed yesterday, results still pending     Lab work from 3/2023 reviewed today     Review of Systems   Constitutional: Negative for activity change, appetite change, chills, diaphoresis, fatigue, fever and unexpected weight change  HENT: Negative for ear pain and sore throat  Eyes: Negative for pain and visual disturbance     Respiratory: Negative for cough, shortness of breath and wheezing  Cardiovascular: Negative for chest pain, palpitations and leg swelling  Gastrointestinal: Positive for nausea (as noted in HPI)  Negative for abdominal pain, constipation, diarrhea and vomiting  Genitourinary: Negative for dysuria and hematuria  Musculoskeletal: Negative for arthralgias and back pain  Skin: Negative for color change and rash  Neurological: Negative for seizures and syncope  Hematological: Negative for adenopathy  Does not bruise/bleed easily  Psychiatric/Behavioral: Negative for dysphoric mood, self-injury, sleep disturbance and suicidal ideas  The patient is not nervous/anxious  All other systems reviewed and are negative  Past Medical History:   Diagnosis Date    Ulnar neuritis, left      Past Surgical History:   Procedure Laterality Date    TONSILLECTOMY       Family History   Problem Relation Age of Onset    Skin cancer Mother    Eva Abt COPD Mother     Lung cancer Father     No Known Problems Sister     No Known Problems Daughter     No Known Problems Maternal Grandmother     No Known Problems Paternal Grandmother     Breast cancer Paternal Aunt [de-identified]    No Known Problems Paternal Aunt      Social History     Socioeconomic History    Marital status: /Civil Union     Spouse name: None    Number of children: None    Years of education: None    Highest education level: None   Occupational History    None   Tobacco Use    Smoking status: Never    Smokeless tobacco: Never   Substance and Sexual Activity    Alcohol use:  Yes    Drug use: Never    Sexual activity: None   Other Topics Concern    None   Social History Narrative    None     Social Determinants of Health     Financial Resource Strain: Not on file   Food Insecurity: Not on file   Transportation Needs: Not on file   Physical Activity: Not on file   Stress: Not on file   Social Connections: Not on file   Intimate Partner Violence: Not on file "  Housing Stability: Not on file     Current Outpatient Medications on File Prior to Visit   Medication Sig    Ranibizumab (LUCENTIS IO)     [DISCONTINUED] Semaglutide-Weight Management (WEGOVY SC)      Allergies   Allergen Reactions    Latex      Immunization History   Administered Date(s) Administered    INFLUENZA 10/08/2013, 10/29/2014, 10/26/2015, 10/26/2016, 10/19/2017, 10/17/2018, 10/23/2019, 10/05/2022    Tdap 12/10/2014       Objective     /80   Pulse 77   Temp (!) 97 1 °F (36 2 °C) (Temporal)   Resp 16   Ht 5' 2\" (1 575 m)   Wt 96 6 kg (213 lb)   SpO2 99%   BMI 38 96 kg/m²     Physical Exam  Constitutional:       General: She is not in acute distress  Appearance: She is well-developed  She is obese  She is not ill-appearing, toxic-appearing or diaphoretic  HENT:      Head: Normocephalic and atraumatic  Eyes:      Extraocular Movements: Extraocular movements intact  Conjunctiva/sclera: Conjunctivae normal       Pupils: Pupils are equal, round, and reactive to light  Neck:      Thyroid: No thyromegaly  Cardiovascular:      Rate and Rhythm: Normal rate and regular rhythm  Heart sounds: Normal heart sounds  No murmur heard  Pulmonary:      Effort: Pulmonary effort is normal  No respiratory distress  Breath sounds: Normal breath sounds  No wheezing  Abdominal:      General: Bowel sounds are normal  There is no distension  Palpations: Abdomen is soft  Tenderness: There is no abdominal tenderness  Musculoskeletal:         General: Normal range of motion  Cervical back: Normal range of motion and neck supple  No rigidity or tenderness  Lymphadenopathy:      Cervical: No cervical adenopathy  Skin:     General: Skin is warm and dry  Neurological:      General: No focal deficit present  Mental Status: She is alert and oriented to person, place, and time     Psychiatric:         Mood and Affect: Mood normal          Behavior: Behavior normal     " Thought Content:  Thought content normal          Judgment: Judgment normal        MAXIM Parisi

## 2023-05-12 RX ORDER — SEMAGLUTIDE 1 MG/.5ML
INJECTION, SOLUTION SUBCUTANEOUS
Qty: 2 ML | Refills: 0 | OUTPATIENT
Start: 2023-05-12

## 2023-11-02 ENCOUNTER — OFFICE VISIT (OUTPATIENT)
Dept: FAMILY MEDICINE CLINIC | Facility: CLINIC | Age: 73
End: 2023-11-02
Payer: COMMERCIAL

## 2023-11-02 VITALS
BODY MASS INDEX: 39.08 KG/M2 | HEART RATE: 72 BPM | OXYGEN SATURATION: 97 % | RESPIRATION RATE: 16 BRPM | WEIGHT: 212.4 LBS | SYSTOLIC BLOOD PRESSURE: 132 MMHG | HEIGHT: 62 IN | DIASTOLIC BLOOD PRESSURE: 64 MMHG | TEMPERATURE: 98 F

## 2023-11-02 DIAGNOSIS — E66.01 CLASS 3 SEVERE OBESITY DUE TO EXCESS CALORIES WITHOUT SERIOUS COMORBIDITY WITH BODY MASS INDEX (BMI) OF 40.0 TO 44.9 IN ADULT (HCC): Primary | ICD-10-CM

## 2023-11-02 PROCEDURE — 99214 OFFICE O/P EST MOD 30 MIN: CPT | Performed by: NURSE PRACTITIONER

## 2023-11-02 NOTE — ASSESSMENT & PLAN NOTE
We will restart Wegovy at the starting dosage of .25mg weekly and titrate up every 4 weeks as tolerated   She did have success with weight loss on this previously   Pt is well aware of possible side effects  Also encouraged to continue with regular exercise and diet

## 2023-11-02 NOTE — PROGRESS NOTES
Name: Sharda Franks      : 1950      MRN: [id number]  Encounter Provider: MAXIM Roberts  Encounter Date: 2023   Encounter department: [address] [place] [address] [address]     1. Class 3 severe obesity due to excess calories without serious comorbidity with body mass index (BMI) of 40.0 to 44.9 in adult [facility])  Assessment & Plan: We will restart Wegovy at the starting dosage of .25mg weekly and titrate up every 4 weeks as tolerated   She did have success with weight loss on this previously   Pt is well aware of possible side effects  Also encouraged to continue with regular exercise and diet     Orders:  -     Semaglutide-Weight Management (WEGOVY) 0.25 MG/0.5ML; Inject 0.5 mL (0.25 mg total) under the skin once a week  -     TSH, 3rd generation with Free T4 reflex; Future  -     HEMOGLOBIN A1C W/ EAG ESTIMATION; Future        Depression Screening and Follow-up Plan: Patient was screened for depression during today's encounter. They screened negative with a PHQ-2 score of 0. Subjective     HPI    Pt presents by herself today for a routine follow up  Doing well overall, has been staying busy. 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 When she increased this dosage to 1 mg weekly, she had pretty significant nausea and even some vomiting at this dosage. She stopped it after this side effect. She feels she is quite active with her lifestyle and is mindful of dietary options. She would like to try the [place] again. BMI at 38.85    DEXA scan normal from 5/3/23  Mammogram also normal 5/3/23    Review of Systems   Constitutional:  Negative for activity change, appetite change, chills, diaphoresis, fatigue, fever and unexpected weight change. Eyes:  Negative for visual disturbance.    Respiratory:  Negative for cough, chest tightness, shortness of breath and wheezing. Cardiovascular:  Negative for chest pain, palpitations and leg swelling. Gastrointestinal:  Negative for abdominal pain, blood in stool, constipation, diarrhea and nausea. Genitourinary:  Negative for dysuria. Musculoskeletal:  Positive for arthralgias. Negative for myalgias. Skin:  Negative for rash and wound. Neurological:  Negative for dizziness, weakness, numbness and headaches. Psychiatric/Behavioral:  Negative for dysphoric mood, self-injury, sleep disturbance and suicidal ideas. The patient is not nervous/anxious.         Past Medical History:   Diagnosis Date    Ulnar neuritis, left      Past Surgical History:   Procedure Laterality Date    TONSILLECTOMY       Family History   Problem Relation Age of Onset    Skin cancer Mother     COPD Mother     Lung cancer Father     No Known Problems Sister     No Known Problems Daughter     No Known Problems Maternal Grandmother     No Known Problems Paternal Grandmother     Breast cancer Paternal Aunt 80    No Known Problems Paternal Aunt      Social History     Socioeconomic History    Marital status: /Civil Union     Spouse name: None    Number of children: None    Years of education: None    Highest education level: None   Occupational History    None   Tobacco Use    Smoking status: Never     Passive exposure: Never    Smokeless tobacco: Never   Substance and Sexual Activity    Alcohol use: Yes    Drug use: Never    Sexual activity: None   Other Topics Concern    None   Social History Narrative    None     Social Determinants of Health     Financial Resource Strain: Not on file   Food Insecurity: Not on file   Transportation Needs: Not on file   Physical Activity: Not on file   Stress: Not on file   Social Connections: Not on file   Intimate Partner Violence: Not on file   Housing Stability: Not on file     Current Outpatient Medications on File Prior to Visit   Medication Sig    Ranibizumab (LUCENTIS IO)      Allergies   Allergen Reactions    Latex      Immunization History   Administered Date(s) Administered    INFLUENZA 10/08/2013, 10/29/2014, 10/26/2015, 10/26/2016, 10/19/2017, 10/17/2018, 10/23/2019, 10/05/2022    Tdap 12/10/2014       Objective     /64   Pulse 72   Temp 98 °F (36.7 °C) (Tympanic)   Resp 16   Ht 5' 2" (1.575 m)   Wt 96.3 kg (212 lb 6.4 oz)   SpO2 97%   BMI 38.85 kg/m²     Physical Exam  Constitutional:       General: She is not in acute distress. Appearance: She is well-developed. She is obese. She is not ill-appearing, toxic-appearing or diaphoretic. HENT:      Head: Normocephalic and atraumatic. Eyes:      Extraocular Movements: Extraocular movements intact. Conjunctiva/sclera: Conjunctivae normal.      Pupils: Pupils are equal, round, and reactive to light. Neck:      Thyroid: No thyromegaly. Cardiovascular:      Rate and Rhythm: Normal rate and regular rhythm. Heart sounds: Normal heart sounds. No murmur heard. Pulmonary:      Effort: Pulmonary effort is normal. No respiratory distress. Breath sounds: Normal breath sounds. No wheezing. Abdominal:      General: Bowel sounds are normal. There is no distension. Palpations: Abdomen is soft. Tenderness: There is no abdominal tenderness. Musculoskeletal:         General: Normal range of motion. Cervical back: Normal range of motion and neck supple. No rigidity or tenderness. Lymphadenopathy:      Cervical: No cervical adenopathy. Skin:     General: Skin is warm and dry. Neurological:      General: No focal deficit present. Mental Status: She is alert and oriented to person, place, and time. Psychiatric:         Mood and Affect: Mood normal.         Behavior: Behavior normal.         Thought Content:  Thought content normal.         Judgment: Judgment normal.       MAXIM Tellez

## 2024-05-08 ENCOUNTER — RA CDI HCC (OUTPATIENT)
Dept: OTHER | Facility: HOSPITAL | Age: 74
End: 2024-05-08

## 2024-05-09 ENCOUNTER — VBI (OUTPATIENT)
Dept: ADMINISTRATIVE | Facility: OTHER | Age: 74
End: 2024-05-09

## 2025-01-14 DIAGNOSIS — Z12.31 ENCOUNTER FOR SCREENING MAMMOGRAM FOR MALIGNANT NEOPLASM OF BREAST: Primary | ICD-10-CM

## 2025-04-16 ENCOUNTER — HOSPITAL ENCOUNTER (OUTPATIENT)
Dept: MAMMOGRAPHY | Facility: HOSPITAL | Age: 75
Discharge: HOME | End: 2025-04-16
Payer: COMMERCIAL

## 2025-04-16 VITALS — WEIGHT: 212.3 LBS | HEIGHT: 62 IN | BODY MASS INDEX: 39.07 KG/M2

## 2025-04-16 DIAGNOSIS — Z12.31 ENCOUNTER FOR SCREENING MAMMOGRAM FOR MALIGNANT NEOPLASM OF BREAST: ICD-10-CM

## 2025-04-16 PROCEDURE — 77067 SCR MAMMO BI INCL CAD: CPT

## 2025-04-16 PROCEDURE — 77063 BREAST TOMOSYNTHESIS BI: CPT
